# Patient Record
Sex: FEMALE | Race: WHITE | NOT HISPANIC OR LATINO | Employment: FULL TIME | URBAN - METROPOLITAN AREA
[De-identification: names, ages, dates, MRNs, and addresses within clinical notes are randomized per-mention and may not be internally consistent; named-entity substitution may affect disease eponyms.]

---

## 2024-01-17 ENCOUNTER — OFFICE VISIT (OUTPATIENT)
Dept: NEUROLOGY | Facility: CLINIC | Age: 34
End: 2024-01-17
Payer: COMMERCIAL

## 2024-01-17 VITALS
TEMPERATURE: 98.4 F | OXYGEN SATURATION: 99 % | HEART RATE: 63 BPM | SYSTOLIC BLOOD PRESSURE: 118 MMHG | DIASTOLIC BLOOD PRESSURE: 70 MMHG | BODY MASS INDEX: 23.61 KG/M2 | HEIGHT: 64 IN | WEIGHT: 138.3 LBS

## 2024-01-17 DIAGNOSIS — G47.33 OSA (OBSTRUCTIVE SLEEP APNEA): ICD-10-CM

## 2024-01-17 DIAGNOSIS — G43.009 MIGRAINE WITHOUT AURA AND WITHOUT STATUS MIGRAINOSUS, NOT INTRACTABLE: Primary | ICD-10-CM

## 2024-01-17 DIAGNOSIS — E55.9 VITAMIN D DEFICIENCY: ICD-10-CM

## 2024-01-17 DIAGNOSIS — E53.8 VITAMIN B12 DEFICIENCY: ICD-10-CM

## 2024-01-17 PROCEDURE — 99215 OFFICE O/P EST HI 40 MIN: CPT | Performed by: PSYCHIATRY & NEUROLOGY

## 2024-01-17 PROCEDURE — G2212 PROLONG OUTPT/OFFICE VIS: HCPCS | Performed by: PSYCHIATRY & NEUROLOGY

## 2024-01-17 RX ORDER — SUMATRIPTAN 100 MG/1
100 TABLET, FILM COATED ORAL ONCE AS NEEDED
Qty: 9 TABLET | Refills: 11 | Status: SHIPPED | OUTPATIENT
Start: 2024-01-17

## 2024-01-17 RX ORDER — LEVONORGESTREL AND ETHINYL ESTRADIOL 0.1-0.02MG
20 KIT ORAL DAILY
COMMUNITY
Start: 2023-10-16

## 2024-01-17 NOTE — PROGRESS NOTES
Review of Systems   Constitutional:  Negative for appetite change, fatigue and fever.   HENT: Negative.  Negative for hearing loss, tinnitus, trouble swallowing and voice change.    Eyes: Negative.  Negative for photophobia, pain and visual disturbance.   Respiratory: Negative.  Negative for shortness of breath.    Cardiovascular: Negative.  Negative for palpitations.   Gastrointestinal: Negative.  Negative for nausea and vomiting.   Endocrine: Negative.  Negative for cold intolerance.   Genitourinary: Negative.  Negative for dysuria, frequency and urgency.   Musculoskeletal:  Negative for back pain, gait problem, myalgias, neck pain and neck stiffness.   Skin: Negative.  Negative for rash.   Allergic/Immunologic: Negative.    Neurological:  Positive for light-headedness (if she gets up too fast w/ a migraine) and headaches (Wakes up with a dull headache). Negative for dizziness, tremors, seizures, syncope, facial asymmetry, speech difficulty, weakness and numbness.   Hematological: Negative.  Does not bruise/bleed easily.   Psychiatric/Behavioral: Negative.  Negative for confusion, hallucinations and sleep disturbance.    All other systems reviewed and are negative.

## 2024-01-17 NOTE — PATIENT INSTRUCTIONS
Consider vitamin B12 1000 to 2000 mcg sublingual/under your tongue daily  Consider increasing her magnesium from 250 mg up to 500 mg    For future reference:  Rescue medications thought to be likely ok during pregnancy:   First line: tylenol/acetaminophen, diphenhydramine/benadryl, Metoclopramide/Reglan, [NSAIDs (ibuprofen, naproxen and diclofenac) could be considered between 12-20 weeks only , but check with OB]  Second line:  rizatriptan/sumatriptan and other triptans, Ondansetron/zofran, prednisone short acting, prochlorperazine/compazine, promethazine/Phenergan, [also fioricet (butalbital-acetaminophen-caffeine) although I do not recommend this typically] -> instead could just take Excedrin tension which is similar to Excedrin Migraine without the aspirin  Avoid when possible: aspirin (except in those instructed to take by OB for other reasons), indomethacin, opioids   Always avoid: DHE/ergots, currently the new migraine meds have too many unknowns so we are avoiding for now until more information - lasmiditan/reyvow, Nurtec/rimegepant, Ubrelvy/Ubrogepant     These medications are thought to be compatible with breast-feeding:  Ibuprofen, acetaminophen, triptans  Probably compatible with breast-feeding:  Diphenhydramine/Benadryl, metoclopramide/Reglan, ondansetron /Zofran, if needed steroids, nurtec  prochlorperazine/Compazine  Avoid: Aspirin, DHE/ergot's, lasmiditan/Reyvow            -If you feel like you could sleep on your back that night only, certainly could try to sleep on your back for the sleep study, but not if you feel like you cannot sleep this way.  Just getting sleep is the most important thing, as the machine thinks you are sleeping the entire time it is plug in. Otherwise we discussed home sleep study can underestimate the problem and try not to plug in the machine until you are just about to fall asleep.  If it comes back that you almost have sleep apnea or other sleep disorder, but not meeting  criteria, we could consider in lab study and reach out to me if you would like this ordered before next visit.    I am placing a referral for a sleep study and if it is abnormal we will place one to the sleep medicine specialist team to further evaluate your sleep issues.  Please call [phone] to schedule the sleep study and afterwards if needed I recommend you see one of the following providers:  Randy Kay PA-C        Headache/migraine treatment:   Abortive medications (for immediate treatment of a headache):   It is ok to take ibuprofen, acetaminophen or naproxen (Advil, Tylenol,  Aleve, Excedrin) if they help your headaches you should limit these to No more than 3 times a week to avoid medication overuse/rebound headaches.      For your more moderate to severe migraines take this medication early   -     SUMAtriptan (IMITREX) 100 mg tablet; Take 1 tablet (100 mg total) by mouth once as needed for migraine May repeat x1 in 2 hours, max 2/24 hours, 9/month      Trial of Nurtec next or we discussed if your insurance prefers Ubrelvy would switch to that  Both of these would require a paperwork on the prior authorization but typically takes 1 to 2 weeks for your insurance to approve.  Then once it is approved there is a coupon card at each of their websites that completely covers the co-pay  *Although we are getting the ball rolling with getting Nurtec approved once you start to consider getting pregnant or at least once pregnant I would stop Nurtec just out of an abundance of precaution since we do not know what dose during pregnancy       Over the counter preventive supplements for headaches/migraines - try for 3 months   (to take every day to help prevent headaches - not to take at the time of headache):  [x] Magnesium 400mg -500 mg daily (If any diarrhea or upset stomach, decrease dose  as tolerated)  []  Riboflavin (Vitamin B2)  400mg daily (adults) - try online   (FYI B2 may make your urine bright/neon yellow)        Prescription preventive medications for headaches/migraines   (to take every day to help prevent headaches - not to take at the time of headache):  [x] we have lots of options if needed       *Typically these types of medications take time untill you see the benefit, although some may see improvement in days, often it may take weeks, especially if the medication is being titrated up to a beneficial level. Please contact us if there are any concerns or questions regarding the medication.         Lifestyle Recommendations:  [x] SLEEP - Maintain a regular sleep schedule: Adults need at least 7-8 hours of uninterrupted a night. Maintain good sleep hygiene:  Going to bed and waking up at consistent times, avoiding excessive daytime naps, avoiding caffeinated beverages in the evening, avoid excessive stimulation in the evening and generally using bed primarily for sleeping.  One hour before bedtime would recommend turning lights down lower, decreasing your activity (may read quietly, listen to music at a low volume). When you get into bed, should eliminate all technology (no texting, emailing, playing with your phone, iPad or tablet in bed).  [x] HYDRATION - Maintain good hydration.  Drink  2L of fluid a day (4 typical small water bottles)  [x] DIET - Maintain good nutrition. In particular don't skip meals and try and eat healthy balanced meals regularly.  [x] TRIGGERS - Look for other triggers and avoid them: Limit caffeine to 1-2 cups a day or less. Avoid dietary triggers that you have noticed bring on your headaches (this could include aged cheese, peanuts, MSG, aspartame and nitrates).  [x] EXERCISE - physical exercise as we all know is good for you in many ways, and not only is good for your heart, but also is beneficial for your mental health, cognitive health and  chronic pain/headaches. I would  encourage at the least 5 days of physical exercise weekly for at least 30 minutes.      Education and Follow-up  [x] Please call with any questions or concerns. Of course if any new concerning symptoms go to the emergency department.  [x] Follow up at least 1 week after MRI and ideally after sleep study but does not have to be - 2-3 months, sooner if needed     - As we discussed if there are no abnormalities on the brain MRI that need urgent intervention (very often there are incidental findings that may not mean anything significant and are better discussed in person), you will not necessarily receive a call from us, but feel free to call in to check on the status of the report (since not knowing can be anxiety provoking) and the nurses will let you know the result or make sure I have nothing to pass on regarding the results first.  Ideally, all of this will be discussed in detail in the follow-up visit.

## 2024-01-17 NOTE — PROGRESS NOTES
[facility] Consult - Follow up   PATIENT:  Merry Venegas  MRN:  [id number]  :  1990  DATE OF SERVICE:  2024  REFERRED BY: No ref. provider found  PMD: Adrian Huffman DO    Assessment/Plan:   Merry Germain is a delightful 33 y.o. female with a past medical history that includes dysthymia/depression, migraines, vitamin-D deficiency  referred here for evaluation of headache.  My initial evaluation 2020     Migraine without aura and without status migrainosus, not intractable  She reports a personal history of migraines in family history of migraines.  She reports pain is typically right frontal and occipital shooting pulsating pressure and rarely on the left side.  She denies aura and reports typical associated migrainous features without associated autonomic features.  - as of 2020: 3-4 migraines a month  - as of 2020: migraines maybe 2-3 a month, not taking supplement but still seem better, rizatriptan helps But makes her tired.  Will see if prochlorperazine helps but does not make her tired.  Trial of magnesium for prevention  - as of 2021: She continues to do well with migraines to 3 times per month.  Taking magnesium for prevention and rizatriptan helps for abortive.  - as of 3/1/2022: continues to do well with about 4 migraines per month on magnesium for prevention that are milder, shorter and respond to ibuprofen, exedrin or rizatriptan.   - as of 2024: She reports increasing frequency and severity of migraines as well as new features and we discussed MRI brain to further evaluate.  She reports over the past year migraines have been worse and on average once a week but she may wake up with a dull headache on most days.  Migraines cannot be associated with positional lightheadedness at times.  Recent eye exam 2023 no papilledema.  Fatigue despite full nights of sleep, symptoms suggestive of possible sleep disorder and sleep  study ordered to further evaluate for possible sleep disorder contributing to symptoms.  She also recently got  and is considering pregnancy in summer 2024 and we discussed the unknowns of medications in pregnancy as well as current guidelines as to what is thought to be the safest although still unknowns.     Workup:  -Due to increased frequency and severity of headaches and migraines as well as concern for increased intracranial pressure which requires ruling out nefarious pathology in the brain increasing pressure, recommend further evaluation with MRI brain with and without contrast to rule out structural or treatable causes of symptoms.  We will pay close attention to the cerebellar tonsils to see if there is any cerebellar ectopia or signs of increased intracranial pressure.  -     Comprehensive metabolic panel; Future  -     CBC and differential; Future  -     TSH, 3rd generation with Free T4 reflex; Future  -     Vitamin D 25 hydroxy; Future  -     Vitamin B12; Future     Preventative:  - we discussed headache hygiene and lifestyle factors that may improve headaches  -We discussed we have multiple prescription preventative options, but they are somewhat limited during pregnancy and prior to pregnancy.  Recommended at minimum increasing magnesium from 250 mg to 500 mg, adding vitamin B2  - On through the providers:  Magnesium 250 mg, vit D 5000 units, Vit C 500 mg, Vit B12 1 tab daily, Lexapro 5 mg (started prior in 2020, off for about a year in 2023 and then restarted at 5 mg around Dec 2023 as she was wondering if that is why she was having more migraines, but mood and otherwise felt fine off of it and she stopped it because she felt like she was in a fog on it.  - past/failed:  Lexapro, antihypertensive such as propranolol or verapamil would be contraindicated currently due to hypotension, amitriptyline or venlafaxine would be currently contraindicated due to interaction with Lexapro  - future  options when not trying to get pregnant or chance of pregnancy:  topiramate, acetazolamide/Diamox, CGRP med, botox   -Future options when trying for pregnancy or pregnant: Cyproheptadine, cyclobenzaprine, propranolol, verapamil, amitriptyline, acetazolamide, she is not currently interested in Botox as we discussed if she were would need lead time since it takes time to kick in and she would have to try to prescription preventatives prior     Abortive:  - discussed not taking over-the-counter or prescription pain medications more than 3 days per week to prevent medication overuse/rebound headache  -   Trial of SUMAtriptan (IMITREX) 100 mg tablet; Take 1 tablet (100 mg total) by mouth once as needed for migraine May repeat x1 in 2 hours, max 2/24 hours, 9/month. Discussed proper use, possible side effects and risks.  - past/helped:  Toradol IM in the past help  - past/failed:  OTC meds, prochlorperazine helped a little only, rizatriptan helps less than it used to and sometimes needs two and can make her tired, sumatriptan now  - future options:  metoclopramide, Toradol IM or p.o., could consider trial for 5 days of Depakote or dexamethasone 4 prolonged migraine, ubrelvy, reyvow, nurtec next if needed discussed*      MELO (obstructive sleep apnea)  -     Ambulatory Referral to Sleep Medicine; Future - home study     Patient instructions     Consider vitamin B12 1000 to 2000 mcg sublingual/under your tongue daily  Consider increasing her magnesium from 250 mg up to 500 mg      For future reference:  Rescue medications thought to be likely ok during pregnancy:   First line: tylenol/acetaminophen, diphenhydramine/benadryl, Metoclopramide/Reglan, [NSAIDs (ibuprofen, naproxen and diclofenac) could be considered between 12-20 weeks only , but check with OB]  Second line:  rizatriptan/sumatriptan and other triptans, Ondansetron/zofran, prednisone short acting, prochlorperazine/compazine, promethazine/Phenergan, [also fioricet  (butalbital-acetaminophen-caffeine) although I do not recommend this typically] -> instead could just take Excedrin tension which is similar to Excedrin Migraine without the aspirin  Avoid when possible: aspirin (except in those instructed to take by OB for other reasons), indomethacin, opioids   Always avoid: DHE/ergots, currently the new migraine meds have too many unknowns so we are avoiding for now until more information - lasmiditan/reyvow, Nurtec/rimegepant, Ubrelvy/Ubrogepant     These medications are thought to be compatible with breast-feeding:  Ibuprofen, acetaminophen, triptans  Probably compatible with breast-feeding:  Diphenhydramine/Benadryl, metoclopramide/Reglan, ondansetron /Zofran, if needed steroids, nurtec  prochlorperazine/Compazine  Avoid: Aspirin, DHE/ergot's, lasmiditan/Reyvow      ---------------------------------    -If you feel like you could sleep on your back that night only, certainly could try to sleep on your back for the sleep study, but not if you feel like you cannot sleep this way.  Just getting sleep is the most important thing, as the machine thinks you are sleeping the entire time it is plug in. Otherwise we discussed home sleep study can underestimate the problem and try not to plug in the machine until you are just about to fall asleep.  If it comes back that you almost have sleep apnea or other sleep disorder, but not meeting criteria, we could consider in lab study and reach out to me if you would like this ordered before next visit.    I am placing a referral for a sleep study and if it is abnormal we will place one to the sleep medicine specialist team to further evaluate your sleep issues.  Please call [phone] to schedule the sleep study and afterwards if needed I recommend you see one of the following providers:  Randy Gordon DO  Earle Martin PA-C        Headache/migraine  treatment:   Abortive medications (for immediate treatment of a headache):   It is ok to take ibuprofen, acetaminophen or naproxen (Advil, Tylenol,  Aleve, Excedrin) if they help your headaches you should limit these to No more than 3 times a week to avoid medication overuse/rebound headaches.      For your more moderate to severe migraines take this medication early   -     SUMAtriptan (IMITREX) 100 mg tablet; Take 1 tablet (100 mg total) by mouth once as needed for migraine May repeat x1 in 2 hours, max 2/24 hours, 9/month      Trial of Nurtec next or we discussed if your insurance prefers Ubrelvy would switch to that  Both of these would require a paperwork on the prior authorization but typically takes 1 to 2 weeks for your insurance to approve.  Then once it is approved there is a coupon card at each of their websites that completely covers the co-pay  *Although we are getting the ball rolling with getting Nurtec approved once you start to consider getting pregnant or at least once pregnant I would stop Nurtec just out of an abundance of precaution since we do not know what dose during pregnancy       Over the counter preventive supplements for headaches/migraines - try for 3 months   (to take every day to help prevent headaches - not to take at the time of headache):  [x] Magnesium 400mg -500 mg daily (If any diarrhea or upset stomach, decrease dose  as tolerated)  [x] Riboflavin (Vitamin B2)  400mg daily (adults) - try online   (FYI B2 may make your urine bright/neon yellow)        Prescription preventive medications for headaches/migraines   (to take every day to help prevent headaches - not to take at the time of headache):  [x] we have lots of options if needed/wanted       *Typically these types of medications take time untill you see the benefit, although some may see improvement in days, often it may take weeks, especially if the medication is being titrated up to a beneficial level. Please contact us  if there are any concerns or questions regarding the medication.         Lifestyle Recommendations:  [x] SLEEP - Maintain a regular sleep schedule: Adults need at least 7-8 hours of uninterrupted a night. Maintain good sleep hygiene:  Going to bed and waking up at consistent times, avoiding excessive daytime naps, avoiding caffeinated beverages in the evening, avoid excessive stimulation in the evening and generally using bed primarily for sleeping.  One hour before bedtime would recommend turning lights down lower, decreasing your activity (may read quietly, listen to music at a low volume). When you get into bed, should eliminate all technology (no texting, emailing, playing with your phone, iPad or tablet in bed).  [x] HYDRATION - Maintain good hydration.  Drink  2L of fluid a day (4 typical small water bottles)  [x] DIET - Maintain good nutrition. In particular don't skip meals and try and eat healthy balanced meals regularly.  [x] TRIGGERS - Look for other triggers and avoid them: Limit caffeine to 1-2 cups a day or less. Avoid dietary triggers that you have noticed bring on your headaches (this could include aged cheese, peanuts, MSG, aspartame and nitrates).  [x] EXERCISE - physical exercise as we all know is good for you in many ways, and not only is good for your heart, but also is beneficial for your mental health, cognitive health and  chronic pain/headaches. I would encourage at the least 5 days of physical exercise weekly for at least 30 minutes.      Education and Follow-up  [x] Please call with any questions or concerns. Of course if any new concerning symptoms go to the emergency department.  [x] Follow up at least 1 week after MRI and ideally after sleep study but does not have to be - 2-3 months, sooner if needed     - As we discussed if there are no abnormalities on the brain MRI that need urgent intervention (very often there are incidental findings that may not mean anything significant and are  better discussed in person), you will not necessarily receive a call from us, but feel free to call in to check on the status of the report (since not knowing can be anxiety provoking) and the nurses will let you know the result or make sure I have nothing to pass on regarding the results first.  Ideally, all of this will be discussed in detail in the follow-up visit.           CC:   We had the pleasure of evaluating Merry Germain in neurological consultation today. Merry Germain is a    right handed female who presents today for evaluation of headaches.      History obtained from patient as well as available medical record review.  History of Present Illness:   Interval history as of 1/17/2024  - no significant new or concerning neurologic symptoms since last visit   -It has been almost 2 years since I last saw her  -  in Oct 2023, moved and living with grandma for now, renovating the house and selling next week, a lot of environmental changes - family planning wise - sometime maybe around summer 2024   - eye exam - June 2023 - contacts sometimes and no changes, pretty mild, -1.5, increases sometimes, none worse, no papilledema, no changes lately     Headaches and migraines   Gradually increasing over the past year  Once a week on avg   Normally lasting 8 hours, but Last week was all week     Can wake up with a dull headache - most days   Can have lightheadedness if she gets up too fast while dealing with a migraine    Preventative:   - Magnesium 250 mg, vit D 5000 units, Vit C 500 mg, Vit B12 1 tab daily  - through other providers: lexapro 5 mg - felt like in a fog on it - that's why she stopped taking it and felt fine off of it and started it in case it was why headaches were worse     Abortive:   Exedrin migraine - maybe 2-3 times a month or more (2 cups a day)  Ibuprofen - not much  Rizatriptan - helps often but sometimes not enough and has to repeat, if she wakes up with makes her more tired but can  manage  Denies bothersome side effects      - sleep - 6-8 hours, sometimes with headaches, otherwise so exhausted out like light, tired in morning, sleep not refreshing, wakes up maybe 1-2 times to pee or dogs, side or back, starts out on back, just got a new pillow, snores, every one in family snores loudly, 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  0 - would never doze  1 - slight chance of dozing  2 - moderate chance of dozing  3 - high chance of dozing    [place] sleepiness scale  Sitting and reading - 2  Watching TV - 3  Sitting, inactive in a public place such as a theater 1  As a passenger in a car for an hour without a break 1  Lying down to rest in afternoon when circumstances permit 3  Sitting and talking to someone 0  Sitting quietly after lunch without alcohol 2  In a car while stopped for few minutes in traffic -0      Interval history as of 3/1/2022  - denies any new or concerning neurologic symptoms since last visit     Headaches and migraines   - continues to do well with about 4 migraines per month that are milder than before and last shorter periods  Preventative: magnesium  Abortive:  Ibuprofen, Rizatriptan works   - prochlorperazine does not work  Denies bothersome side effects     Interval history as of 02/22/2021:  - no new concerns  Migraines are improving, not as frequent and not as severe, 2-3 a month  Prevention: magnesium   Abortive: rizatriptan helps But makes her tired.    - prochlorperazine does not work as well     Interval history as of 9/21/2020:  Migraines - as of 9/21/2020: maybe 2-3 a month, not taking supplements, rizatriptan helps   Prevention:  Headache preventative supplements  Abortive:  trial of rizatriptan - working well, used 8 in 3 months, makes her a little sleepy         Headaches started at what age? More so in mid 20s years old  How often do the headaches  occur?   - as of 6/17/2020: 3-4 migraines a month  - as of 9/21/2020: maybe 2-3 a month, not taking supplements, rizatriptan helps   What time of the day do the headaches start?  Often wakes up with it   How long do the headaches last? 1-2 days, Up to 2 weeks   Are you ever headache free? Yes     Aura? without aura     Last eye exam:   1/2020 - normal except for glasses      Where is your headache located and pain quality?   - right frontal and occipital - shooting, pulsating, pressure  - rarely on the left side      What is the intensity of pain? Average: 4-5/10, worst 6-7/10  Associated symptoms:   [x] Nausea       [] Vomiting        [] Diarrhea   [x] Stiff or sore neck   [x] Problems with concentration  [x] Photophobia  >   [x]Phonophobia      [] Osmophobia  [x] Blurred vision - maybe slight  [x] Prefer quiet, dark room  [] Light-headed or dizzy     [] Tinnitus    [] Hands or feet tingle or feel numb/paresthesias       [] Red ear      [] Ptosis      [] Facial droop  [] Lacrimation - both eyes without migraine  [] Nasal congestion- pressure sometimes without migraine    [] Flushing of face  [] Change in pupil size        Things that make the headache worse? No specific movements, any movement      Headache triggers:   menses      Have you seen someone else for headaches or pain? Yes, PCP  Have you had trigger point injection performed and how often? No  Have you had Botox injection performed and how often? No   Have you had epidural injections or transforaminal injections performed? No  Are you current pregnant or planning on getting pregnant? No plans, TBD, on birth control   Have you ever had any Brain imaging? no     What medications do you take or have you taken for your headaches?   ABORTIVE:    OTC medications have been ineffective      Excedrin - helps a little      Past:   toradol IM - helped      PREVENTIVE:   -     For mood:  Lexapro 10 mg - over a year and helping      Past:  Antihypertensive such as  verapamil or propranolol be contraindicated due to hypotension at this time     Alternative therapies used in the past for headaches? no     LIFESTYLE  Sleep   - averages: 7-9 hours  Problems falling asleep?:   No  Problems staying asleep?:  No     Physical activity: 3 days a week      Water: 1 gallon per day  Caffeine: 2 cups per day     Mood: dysthmia  - counseling when younger      The following portions of the patient's history were reviewed and updated as appropriate: allergies, current medications, past family history, past medical history, past social history, past surgical history and problem list.     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  Education: BS  Lives with boyfriend and dog      Illicit Drugs: denies  Alcohol/tobacco: quit tobacco 2018, social alcohol     Past Medical History:     Past Medical History:   Diagnosis Date    Depression     Migraine        Patient Active Problem List   Diagnosis    Migraine headache    Dysthymia    Migraine without aura and without status migrainosus, not intractable       Medications:      Current Outpatient Medications   Medication Sig Dispense Refill    ascorbic acid (VITAMIN C) 250 mg tablet Take 250 mg by mouth daily      cholecalciferol (VITAMIN D3) 400 units tablet Take 400 Units by mouth daily      Cyanocobalamin (VITAMIN B 12 PO) Take 1 tablet by mouth daily as needed      escitalopram (LEXAPRO) 10 mg tablet Take 1 tablet (10 mg total) by mouth daily 30 tablet 5    LESSINA 0.1-20 MG-MCG per tablet       levonorgestrel-ethinyl estradiol (AVIANE,ALESSE,LESSINA) 0.1-20 MG-MCG per tablet Take 20 mcg by mouth daily      magnesium gluconate (MAGONATE) 500 mg tablet Take 250 mg by mouth daily      Multiple Vitamin (MULTIVITAMIN) tablet Take 1 tablet by mouth daily      SUMAtriptan  (IMITREX) 100 mg tablet Take 1 tablet (100 mg total) by mouth once as needed for migraine May repeat x1 in 2 hours, max 2/24 hours, 9/month 9 tablet 11     No current facility-administered medications for this visit.        Allergies:      Allergies   Allergen Reactions    Sulfamethoxazole-Trimethoprim      Other reaction(s): Other (Please comment)  Reported by Pharmacy       Family History:     Family History   Problem Relation Age of Onset    Hypertension Mother     Asthma Mother     Fibromyalgia Mother     Colon cancer Father     No Known Problems Sister     No Known Problems Brother     COPD Maternal Grandmother     No Known Problems Maternal Grandfather     Hypothyroidism Paternal Grandmother     Diabetes Paternal Grandfather     Heart block Paternal Grandfather     No Known Problems Brother        Social History:     Social History     Socioeconomic History    Marital status: /Civil Union     Spouse name: Not on file    Number of children: Not on file    Years of education: Not on file    Highest education level: Not on file   Occupational History    Occupation:    Tobacco Use    Smoking status: Former     Passive exposure: Never    Smokeless tobacco: Never    Tobacco comments:     2018   Vaping Use    Vaping status: Never Used   Substance and Sexual Activity    Alcohol use: Yes     Comment: occassional    Drug use: Never    Sexual activity: Yes     Partners: Male   Other Topics Concern    Not on file   Social History Narrative    Wears eat belt     Social Determinants of Health     Financial Resource Strain: Not on file   Food Insecurity: Not on file   Transportation Needs: Not on file   Physical Activity: Not on file   Stress: Not on file   Social Connections: Not on file   Intimate Partner Violence: Not on file   Housing Stability: Not on file         Objective:     Physical Exam:                                                                 Vitals:            Constitutional:    BP  "118/70 (BP Location: Left arm, Patient Position: Sitting, Cuff Size: Adult)   Pulse 63   Temp 98.4 °F (36.9 °C) (Temporal)   Ht 5' 4\" (1.626 m)   Wt 62.7 kg (138 lb 4.8 oz)   SpO2 99%   BMI 23.74 kg/m²   BP Readings from Last 3 Encounters:   01/17/24 118/70   05/13/23 97/71   07/18/22 112/72     Pulse Readings from Last 3 Encounters:   01/17/24 63   09/09/23 78   05/13/23 90         Well developed, well nourished, well groomed.        Psychiatric:  Normal behavior and appropriate affect        Neurological Examination:     Mental status/cognitive function:   Recent and remote memory appear intact. Attention span and concentration as well as fund of knowledge are appropriate for age. Normal language and spontaneous speech.  Cranial Nerves:   VII-facial expression symmetric  Motor Exam: symmetric bulk throughout. no atrophy, fasciculations or abnormal movements noted.   Coordination:  no apparent dysmetria, ataxia or tremor noted  Gait: steady casual gait         Pertinent lab results:   See EMR for recent labs  -7/20/2021   05/27/2020 CMP and CBC unremarkable, TSH normal     Imaging:   No head imaging noted in system     Review of Systems:   ROS obtained by medical assistant and personally reviewed, but if any symptoms listed below say negative, does not mean patient has not had this symptom since last visit, please see HPI for details of symptoms discussed this visit. I recommended PCP follow up for non neurologic problems.      Review of Systems   Constitutional:  Negative for appetite change, fatigue and fever.   HENT: Negative.  Negative for hearing loss, tinnitus, trouble swallowing and voice change.    Eyes: Negative.  Negative for photophobia, pain and visual disturbance.   Respiratory: Negative.  Negative for shortness of breath.    Cardiovascular: Negative.  Negative for palpitations.   Gastrointestinal: Negative.  Negative for nausea and vomiting.   Endocrine: Negative.  Negative for cold " intolerance.   Genitourinary: Negative.  Negative for dysuria, frequency and urgency.   Musculoskeletal:  Negative for back pain, gait problem, myalgias, neck pain and neck stiffness.   Skin: Negative.  Negative for rash.   Allergic/Immunologic: Negative.    Neurological:  Positive for light-headedness (if she gets up too fast w/ a migraine) and headaches (Wakes up with a dull headache). Negative for dizziness, tremors, seizures, syncope, facial asymmetry, speech difficulty, weakness and numbness.   Hematological: Negative.  Does not bruise/bleed easily.   Psychiatric/Behavioral: Negative.  Negative for confusion, hallucinations and sleep disturbance.    All other systems reviewed and are negative.       I have spent 60 minutes with Patient  today in which greater than 50% of this time was spent in counseling/coordination of care regarding Diagnostic results, Prognosis, Risks and benefits of tx options, Instructions for management, Patient and family education, Importance of tx compliance, Risk factor reductions, Impressions, Counseling / Coordination of care, Documenting in the medical record, Reviewing / ordering tests, medicine, procedures  , and Obtaining or reviewing history  . I also spent  7 minutes non face to face for this patient the same day.       Author:  Argenis Knapp MD 1/17/2024 6:10 PM

## 2024-01-19 DIAGNOSIS — G47.33 OSA (OBSTRUCTIVE SLEEP APNEA): Primary | ICD-10-CM

## 2024-01-26 ENCOUNTER — HOSPITAL ENCOUNTER (OUTPATIENT)
Dept: SLEEP CENTER | Facility: HOSPITAL | Age: 34
Discharge: HOME | End: 2024-01-26
Attending: PSYCHIATRY & NEUROLOGY
Payer: COMMERCIAL

## 2024-01-26 ENCOUNTER — APPOINTMENT (OUTPATIENT)
Dept: LAB | Facility: HOSPITAL | Age: 34
End: 2024-01-26
Payer: COMMERCIAL

## 2024-01-26 DIAGNOSIS — G47.33 OSA (OBSTRUCTIVE SLEEP APNEA): ICD-10-CM

## 2024-01-26 DIAGNOSIS — E53.8 VITAMIN B12 DEFICIENCY: ICD-10-CM

## 2024-01-26 DIAGNOSIS — G43.009 MIGRAINE WITHOUT AURA AND WITHOUT STATUS MIGRAINOSUS, NOT INTRACTABLE: ICD-10-CM

## 2024-01-26 DIAGNOSIS — E55.9 VITAMIN D DEFICIENCY: ICD-10-CM

## 2024-01-26 LAB
25(OH)D3 SERPL-MCNC: 94.1 NG/ML (ref 30–100)
ALBUMIN SERPL BCP-MCNC: 4.6 G/DL (ref 3.5–5)
ALP SERPL-CCNC: 33 U/L (ref 34–104)
ALT SERPL W P-5'-P-CCNC: 11 U/L (ref 7–52)
ANION GAP SERPL CALCULATED.3IONS-SCNC: 6 MMOL/L
AST SERPL W P-5'-P-CCNC: 20 U/L (ref 13–39)
BASOPHILS # BLD AUTO: 0.06 THOUSANDS/ÂΜL (ref 0–0.1)
BASOPHILS NFR BLD AUTO: 1 % (ref 0–1)
BILIRUB SERPL-MCNC: 0.48 MG/DL (ref 0.2–1)
BUN SERPL-MCNC: 11 MG/DL (ref 5–25)
CALCIUM SERPL-MCNC: 9.6 MG/DL (ref 8.4–10.2)
CHLORIDE SERPL-SCNC: 103 MMOL/L (ref 96–108)
CO2 SERPL-SCNC: 27 MMOL/L (ref 21–32)
CREAT SERPL-MCNC: 0.88 MG/DL (ref 0.6–1.3)
EOSINOPHIL # BLD AUTO: 0.16 THOUSAND/ÂΜL (ref 0–0.61)
EOSINOPHIL NFR BLD AUTO: 3 % (ref 0–6)
ERYTHROCYTE [DISTWIDTH] IN BLOOD BY AUTOMATED COUNT: 12.4 % (ref 11.6–15.1)
GFR SERPL CREATININE-BSD FRML MDRD: 86 ML/MIN/1.73SQ M
GLUCOSE P FAST SERPL-MCNC: 91 MG/DL (ref 65–99)
HCT VFR BLD AUTO: 41.6 % (ref 34.8–46.1)
HGB BLD-MCNC: 13.4 G/DL (ref 11.5–15.4)
IMM GRANULOCYTES # BLD AUTO: 0.01 THOUSAND/UL (ref 0–0.2)
IMM GRANULOCYTES NFR BLD AUTO: 0 % (ref 0–2)
LYMPHOCYTES # BLD AUTO: 1.22 THOUSANDS/ÂΜL (ref 0.6–4.47)
LYMPHOCYTES NFR BLD AUTO: 20 % (ref 14–44)
MCH RBC QN AUTO: 30.9 PG (ref 26.8–34.3)
MCHC RBC AUTO-ENTMCNC: 32.2 G/DL (ref 31.4–37.4)
MCV RBC AUTO: 96 FL (ref 82–98)
MONOCYTES # BLD AUTO: 0.44 THOUSAND/ÂΜL (ref 0.17–1.22)
MONOCYTES NFR BLD AUTO: 7 % (ref 4–12)
NEUTROPHILS # BLD AUTO: 4.2 THOUSANDS/ÂΜL (ref 1.85–7.62)
NEUTS SEG NFR BLD AUTO: 69 % (ref 43–75)
NRBC BLD AUTO-RTO: 0 /100 WBCS
PLATELET # BLD AUTO: 236 THOUSANDS/UL (ref 149–390)
PMV BLD AUTO: 9.7 FL (ref 8.9–12.7)
POTASSIUM SERPL-SCNC: 4.4 MMOL/L (ref 3.5–5.3)
PROT SERPL-MCNC: 7.7 G/DL (ref 6.4–8.4)
RBC # BLD AUTO: 4.34 MILLION/UL (ref 3.81–5.12)
SODIUM SERPL-SCNC: 136 MMOL/L (ref 135–147)
TSH SERPL DL<=0.05 MIU/L-ACNC: 1.44 UIU/ML (ref 0.45–4.5)
WBC # BLD AUTO: 6.09 THOUSAND/UL (ref 4.31–10.16)

## 2024-01-26 PROCEDURE — 82306 VITAMIN D 25 HYDROXY: CPT

## 2024-01-26 PROCEDURE — 84443 ASSAY THYROID STIM HORMONE: CPT

## 2024-01-26 PROCEDURE — 36415 COLL VENOUS BLD VENIPUNCTURE: CPT

## 2024-01-26 PROCEDURE — 82607 VITAMIN B-12: CPT

## 2024-01-26 PROCEDURE — 85025 COMPLETE CBC W/AUTO DIFF WBC: CPT

## 2024-01-26 PROCEDURE — G0399 HOME SLEEP TEST/TYPE 3 PORTA: HCPCS

## 2024-01-26 PROCEDURE — 80053 COMPREHEN METABOLIC PANEL: CPT

## 2024-01-26 NOTE — PROGRESS NOTES
Home Sleep Study Documentation    HOME STUDY DEVICE: Noxturnal no                                           Thania G3 yes      Pre-Sleep Home Study:    Set-up and instructions performed by: Awilda    Technician performed demonstration for Patient: yes    Return demonstration performed by Patient: yes    Written instructions provided to Patient: yes    Patient signed consent form: yes        Post-Sleep Home Study:    Additional comments by Patient: None    Home Sleep Study Failed:no:    Failure reason: N/A    Reported or Detected: N/A    Scored by: VERONICA Heard

## 2024-01-27 LAB — VIT B12 SERPL-MCNC: 1025 PG/ML (ref 180–914)

## 2024-01-29 PROBLEM — G47.33 OSA (OBSTRUCTIVE SLEEP APNEA): Status: ACTIVE | Noted: 2024-01-29

## 2024-01-31 ENCOUNTER — HOSPITAL ENCOUNTER (OUTPATIENT)
Dept: MRI IMAGING | Facility: HOSPITAL | Age: 34
Discharge: HOME | End: 2024-01-31
Attending: PSYCHIATRY & NEUROLOGY
Payer: COMMERCIAL

## 2024-01-31 DIAGNOSIS — G43.009 MIGRAINE WITHOUT AURA AND WITHOUT STATUS MIGRAINOSUS, NOT INTRACTABLE: ICD-10-CM

## 2024-01-31 PROCEDURE — G1004 CDSM NDSC: HCPCS

## 2024-01-31 PROCEDURE — 70553 MRI BRAIN STEM W/O & W/DYE: CPT

## 2024-01-31 PROCEDURE — A9585 GADOBUTROL INJECTION: HCPCS | Performed by: PSYCHIATRY & NEUROLOGY

## 2024-01-31 RX ORDER — GADOBUTROL 604.72 MG/ML
6 INJECTION INTRAVENOUS
Status: COMPLETED | OUTPATIENT
Start: 2024-01-31 | End: 2024-01-31

## 2024-01-31 RX ADMIN — GADOBUTROL 6 ML: 604.72 INJECTION INTRAVENOUS at 10:22

## 2024-02-23 ENCOUNTER — OFFICE VISIT (OUTPATIENT)
Dept: FAMILY MEDICINE CLINIC | Facility: CLINIC | Age: 34
End: 2024-02-23
Payer: COMMERCIAL

## 2024-02-23 VITALS
DIASTOLIC BLOOD PRESSURE: 68 MMHG | OXYGEN SATURATION: 99 % | TEMPERATURE: 98.3 F | BODY MASS INDEX: 22.81 KG/M2 | HEART RATE: 69 BPM | HEIGHT: 64 IN | SYSTOLIC BLOOD PRESSURE: 126 MMHG | WEIGHT: 133.6 LBS

## 2024-02-23 DIAGNOSIS — B02.9 HERPES ZOSTER WITHOUT COMPLICATION: Primary | ICD-10-CM

## 2024-02-23 PROCEDURE — 99213 OFFICE O/P EST LOW 20 MIN: CPT | Performed by: FAMILY MEDICINE

## 2024-02-23 RX ORDER — VALACYCLOVIR HYDROCHLORIDE 1 G/1
1000 TABLET, FILM COATED ORAL 3 TIMES DAILY
Qty: 21 TABLET | Refills: 0 | Status: SHIPPED | OUTPATIENT
Start: 2024-02-23 | End: 2024-03-01

## 2024-03-15 ENCOUNTER — OFFICE VISIT (OUTPATIENT)
Dept: FAMILY MEDICINE CLINIC | Facility: CLINIC | Age: 34
End: 2024-03-15
Payer: COMMERCIAL

## 2024-03-15 VITALS
WEIGHT: 133.2 LBS | HEART RATE: 75 BPM | HEIGHT: 64 IN | DIASTOLIC BLOOD PRESSURE: 70 MMHG | BODY MASS INDEX: 22.74 KG/M2 | SYSTOLIC BLOOD PRESSURE: 108 MMHG | OXYGEN SATURATION: 98 % | TEMPERATURE: 97.7 F

## 2024-03-15 DIAGNOSIS — J06.9 UPPER RESPIRATORY TRACT INFECTION, UNSPECIFIED TYPE: Primary | ICD-10-CM

## 2024-03-15 PROCEDURE — 99213 OFFICE O/P EST LOW 20 MIN: CPT | Performed by: FAMILY MEDICINE

## 2024-03-15 RX ORDER — AZITHROMYCIN 250 MG/1
TABLET, FILM COATED ORAL
Qty: 6 TABLET | Refills: 0 | Status: SHIPPED | OUTPATIENT
Start: 2024-03-15 | End: 2024-03-19

## 2024-03-15 RX ORDER — BENZONATATE 100 MG/1
100 CAPSULE ORAL 3 TIMES DAILY PRN
Qty: 15 CAPSULE | Refills: 0 | Status: SHIPPED | OUTPATIENT
Start: 2024-03-15

## 2024-03-15 NOTE — PROGRESS NOTES
Name: Merry Venegas      : 1990      MRN: [id number]  Encounter Provider: Adrian Huffman DO  Encounter Date: 3/15/2024   Encounter department: [facility]    Assessment & Plan   Patient can continue Zyrtec.  Push fluids.  Rx put in for Tessalon Perles.  If symptoms continue, or get worse, start a Z-Miles.  Call us with any concerns.  1. Upper respiratory tract infection, unspecified type  -     benzonatate (TESSALON PERLES) 100 mg capsule; Take 1 capsule (100 mg total) by mouth 3 (three) times a day as needed for cough  -     azithromycin (ZITHROMAX) 250 mg tablet; Take 2 tablets today then 1 tablet daily x 4 days           Subjective     Patient here today stating  night into Monday started with congestion, sore throat and a low-grade temperature of 100 degrees.  Cough started today.  No nausea or vomiting.  Did try Zyrtec last night, also been taking DayQuil and NyQuil    Cough  This is a new problem. The current episode started in the past 7 days. The problem has been waxing and waning. The problem occurs every few minutes. The cough is Non-productive and productive of sputum. Associated symptoms include a fever (This past Monday, none since), myalgias, rhinorrhea and a sore throat (Improved). Pertinent negatives include no chest pain, chills, ear congestion or ear pain. The symptoms are aggravated by lying down.     Review of Systems   Constitutional:  Positive for fever (This past Monday, none since). Negative for chills.   HENT:  Positive for rhinorrhea and sore throat (Improved). Negative for ear pain.    Respiratory:  Positive for cough (Started this morning, dry).    Cardiovascular:  Negative for chest pain.   Musculoskeletal:  Positive for myalgias.       Past Medical History:   Diagnosis Date   • Depression    • Migraine      Past Surgical History:   Procedure Laterality Date   • WISDOM TOOTH EXTRACTION Bilateral          Family History   Problem Relation Age of Onset   •  Hypertension Mother    • Asthma Mother    • Fibromyalgia Mother    • Colon cancer Father    • No Known Problems Sister    • No Known Problems Brother    • COPD Maternal Grandmother    • No Known Problems Maternal Grandfather    • Hypothyroidism Paternal Grandmother    • Diabetes Paternal Grandfather    • Heart block Paternal Grandfather    • No Known Problems Brother      Social History     Socioeconomic History   • Marital status: /Civil Union     Spouse name: None   • Number of children: None   • Years of education: None   • Highest education level: None   Occupational History   • Occupation:    Tobacco Use   • Smoking status: Former     Passive exposure: Never   • Smokeless tobacco: Never   • Tobacco comments:     2018   Vaping Use   • Vaping status: Never Used   Substance and Sexual Activity   • Alcohol use: Yes     Comment: occassional   • Drug use: Never   • Sexual activity: Yes     Partners: Male   Other Topics Concern   • None   Social History Narrative    Wears eat belt     Social Determinants of Health     Financial Resource Strain: Not on file   Food Insecurity: Not on file   Transportation Needs: Not on file   Physical Activity: Not on file   Stress: Not on file   Social Connections: Not on file   Intimate Partner Violence: Not on file   Housing Stability: Not on file     Current Outpatient Medications on File Prior to Visit   Medication Sig   • ascorbic acid (VITAMIN C) 250 mg tablet Take 250 mg by mouth daily   • cholecalciferol (VITAMIN D3) 400 units tablet Take 400 Units by mouth daily   • Cyanocobalamin (VITAMIN B 12 PO) Take 1 tablet by mouth daily as needed   • magnesium gluconate (MAGONATE) 500 mg tablet Take 250 mg by mouth daily   • Multiple Vitamin (MULTIVITAMIN) tablet Take 1 tablet by mouth daily   • SUMAtriptan (IMITREX) 100 mg tablet Take 1 tablet (100 mg total) by mouth once as needed for migraine May repeat x1 in 2 hours, max 2/24 hours, 9/month   • LESSINA 0.1-20  "MG-MCG per tablet  (Patient not taking: Reported on 2/23/2024)   • [DISCONTINUED] escitalopram (LEXAPRO) 10 mg tablet Take 1 tablet (10 mg total) by mouth daily (Patient not taking: Reported on 2/23/2024)   • [DISCONTINUED] levonorgestrel-ethinyl estradiol (AVIANE,ALESSE,LESSINA) 0.1-20 MG-MCG per tablet Take 20 mcg by mouth daily   • [DISCONTINUED] valACYclovir (VALTREX) 1,000 mg tablet Take 1 tablet (1,000 mg total) by mouth 3 (three) times a day for 7 days     Allergies   Allergen Reactions   • Sulfamethoxazole-Trimethoprim      Other reaction(s): Other (Please comment)  Reported by Pharmacy     Immunization History   Administered Date(s) Administered   • COVID-19 MODERNA VACC 0.5 ML IM 04/06/2021, 05/06/2021   • Influenza Injectable, MDCK, Preservative Free, Quadrivalent, 0.5 mL 10/18/2019   • Influenza, injectable, quadrivalent, preservative free 0.5 mL 10/24/2018   • Tdap 09/09/2023       Objective     /70   Pulse 75   Temp 97.7 °F (36.5 °C)   Ht 5' 4\" (1.626 m)   Wt 60.4 kg (133 lb 3.2 oz)   SpO2 98%   BMI 22.86 kg/m²     Physical Exam  Vitals reviewed.   Constitutional:       General: She is not in acute distress.     Appearance: Normal appearance. She is well-developed. She is not ill-appearing, toxic-appearing or diaphoretic.   HENT:      Head: Normocephalic and atraumatic.      Nose: Congestion present.   Eyes:      Conjunctiva/sclera: Conjunctivae normal.   Cardiovascular:      Rate and Rhythm: Normal rate and regular rhythm.      Heart sounds: Normal heart sounds. No murmur heard.     No friction rub. No gallop.   Pulmonary:      Effort: Pulmonary effort is normal. No respiratory distress.      Breath sounds: Normal breath sounds. No wheezing, rhonchi or rales.   Musculoskeletal:      Right lower leg: No edema.      Left lower leg: No edema.   Neurological:      General: No focal deficit present.      Mental Status: She is alert and oriented to person, place, and time.   Psychiatric:         " Mood and Affect: Mood normal.         Behavior: Behavior normal.         Thought Content: Thought content normal.         Judgment: Judgment normal.       Adrian Huffman, DO

## 2024-03-19 ENCOUNTER — TELEPHONE (OUTPATIENT)
Dept: NEUROLOGY | Facility: CLINIC | Age: 34
End: 2024-03-19

## 2024-03-19 NOTE — TELEPHONE ENCOUNTER
Called and spoke to patient to confirm their upcoming appointment with Dr. Knapp. Informed patient about arriving in the [place] location 15 minutes prior to their appointment to get checked in and going over chart.

## 2024-03-26 ENCOUNTER — OFFICE VISIT (OUTPATIENT)
Dept: NEUROLOGY | Facility: CLINIC | Age: 34
End: 2024-03-26
Payer: COMMERCIAL

## 2024-03-26 VITALS
TEMPERATURE: 98.4 F | WEIGHT: 131 LBS | DIASTOLIC BLOOD PRESSURE: 65 MMHG | BODY MASS INDEX: 22.36 KG/M2 | HEART RATE: 63 BPM | SYSTOLIC BLOOD PRESSURE: 115 MMHG | HEIGHT: 64 IN

## 2024-03-26 DIAGNOSIS — G43.009 MIGRAINE WITHOUT AURA AND WITHOUT STATUS MIGRAINOSUS, NOT INTRACTABLE: Primary | ICD-10-CM

## 2024-03-26 PROCEDURE — 99215 OFFICE O/P EST HI 40 MIN: CPT | Performed by: PSYCHIATRY & NEUROLOGY

## 2024-03-26 NOTE — PROGRESS NOTES
[facility] Consult - Follow up   PATIENT:  Merry Venegas  MRN:  [id number]  :  1990  DATE OF SERVICE:  3/26/2024  REFERRED BY: No ref. provider found  PMD: Adrian Huffman DO    Assessment/Plan:   Merry Germain is a delightful 34 y.o. female with a past medical history that includes dysthymia/depression, migraines, vitamin-D deficiency  referred here for evaluation of headache.  My initial evaluation 2020     Migraine without aura and without status migrainosus, not intractable  Apneas without meeting criteria for MELO (home study, 2024, 4 obstructive apneas, 3 hypopneas, AURELIA 0.8, oxygen down to 88%) she is not currently interested in in lab study to further evaluate  She reports a personal history of migraines in family history of migraines.  She reports pain is typically right frontal and occipital shooting pulsating pressure and rarely on the left side.  She denies aura and reports typical associated migrainous features without associated autonomic features.  - as of 2020: 3-4 migraines a month  - as of 2020: migraines maybe 2-3 a month, not taking supplement but still seem better, rizatriptan helps But makes her tired.  Will see if prochlorperazine helps but does not make her tired.  Trial of magnesium for prevention  - as of 2021: She continues to do well with migraines to 3 times per month.  Taking magnesium for prevention and rizatriptan helps for abortive.  - as of 3/1/2022: continues to do well with about 4 migraines per month on magnesium for prevention that are milder, shorter and respond to ibuprofen, exedrin or rizatriptan.   - as of 2024: She reports increasing frequency and severity of migraines as well as new features and we discussed MRI brain to further evaluate.  She reports over the past year migraines have been worse and on average once a week but she may wake up with a dull headache on most days.  Migraines cannot  be associated with positional lightheadedness at times.  Recent eye exam June 2023 no papilledema.  Fatigue despite full nights of sleep, symptoms suggestive of possible sleep disorder and sleep study ordered to further evaluate for possible sleep disorder contributing to symptoms.  She also recently got  and is considering pregnancy in summer 2024 and we discussed the unknowns of medications in pregnancy as well as current guidelines as to what is thought to be the safest although still unknowns.  - as of 3/26/2024: She reports she took the bowl by the horns and after last visit started a journal to look for associations with dietary intake, weather, sleep etc. and migraines and made significant lifestyle changes for the better which have significantly improved her headaches.  She reports migraines about twice a month and sumatriptan 100 mg works well which she only needed a few times.  Did not make her more sleepy than rizatriptan and not currently interested in Nurtec or Ubrelvy trial which may not cause such side effects as Excedrin also helps the milder ones.  She was also to come off Lexapro and mood remains well-controlled despite stressors at home.  Continue headache preventative supplements and she did increase magnesium from 250 mg to 500 mg.  We reviewed MRI brain imaging together in detail as well as my over read which shows subtle nonspecific features.  We reviewed home sleep study which showed 4 apneas and she is currently not interested in in lab sleep study to further evaluate sleep.  Labs also looked excellent    Workup:  -MRI brain with and without contrast 1/31/2024: Per radiology No acute infarction, intracranial hemorrhage or mass.  *as of retrospective review 3/26/24 she has a slight dip in sella, in my opinion slight prominence of bilateral optic nerve sheath, ventricles appear normal, cerebellar tonsils overlie the foramen magnum with tight junction without Chiari  malformation  -1/26/2024 CMP and CBC unremarkable, vitamin D 94, B12 1025, TSH normal (we decreased vitamin B12 from 2500 mcg to 2000 mcg after this)     Preventative:  - we discussed headache hygiene and lifestyle factors that may improve headaches  -We discussed we have multiple prescription preventative options, but they are somewhat limited during pregnancy and prior to pregnancy.  Recommended at minimum increasing magnesium from 250 mg to 500 mg, adding vitamin B2  - On through the providers:  Magnesium 500 mg, vit D 5000 units, Vit C 500 mg, Vit B12 1 tab daily, Lexapro 5 mg - stopped at last visit and mood ok - normal life stressors living with family  - past/failed:  Lexapro, antihypertensive such as propranolol or verapamil would be contraindicated currently due to hypotension, amitriptyline or venlafaxine would be currently contraindicated due to interaction with Lexapro  - future options when not trying to get pregnant or chance of pregnancy:  topiramate, acetazolamide/Diamox, CGRP med, botox   -Future options when trying for pregnancy or pregnant: Cyproheptadine, cyclobenzaprine, propranolol, verapamil, amitriptyline, acetazolamide, she is not currently interested in Botox as we discussed if she were would need lead time since it takes time to kick in and she would have to try to prescription preventatives prior     Abortive:  - discussed not taking over-the-counter or prescription pain medications more than 3 days per week to prevent medication overuse/rebound headache  -   SUMAtriptan (IMITREX) 100 mg tablet; Take 1 tablet (100 mg total) by mouth once as needed for migraine May repeat x1 in 2 hours, max 2/24 hours, 9/month. Discussed proper use, possible side effects and risks.  - past/helped:  Toradol IM in the past help  - past/failed:  OTC meds, prochlorperazine helped a little only, rizatriptan helps less than it used to and sometimes needs two and can make her tired, sumatriptan   - future  options:  metoclopramide, Toradol IM or p.o., could consider trial for 5 days of Depakote or dexamethasone 4 prolonged migraine, ubrelvy, reyvow, nurtec next if needed discussed*    Patient instructions     Congratulations and keep up the strong work with all the positive lifestyle changes you made between visits!      We discussed if you would ever want an in lab sleep study just let me know as sometimes a home study can underestimate the problem and suggest false negative results.     Consider vitamin B12 2500 mcg decrease to 2000 mcg sublingual/under your tongue daily  Consider increasing her magnesium from 500 mg          -----------  For future reference:  Rescue medications thought to be likely ok during pregnancy:   First line: tylenol/acetaminophen, diphenhydramine/benadryl, Metoclopramide/Reglan, [NSAIDs (ibuprofen, naproxen and diclofenac) could be considered between 12-20 weeks only , but check with OB]  Second line:  rizatriptan/sumatriptan and other triptans, Ondansetron/zofran, prednisone short acting, prochlorperazine/compazine, promethazine/Phenergan, [also fioricet (butalbital-acetaminophen-caffeine) although I do not recommend this typically] -> instead could just take Excedrin tension which is similar to Excedrin Migraine without the aspirin  Avoid when possible: aspirin (except in those instructed to take by OB for other reasons), indomethacin, opioids   Always avoid: DHE/ergots, currently the new migraine meds have too many unknowns so we are avoiding for now until more information - lasmiditan/reyvow, Nurtec/rimegepant, Ubrelvy/Ubrogepant     These medications are thought to be compatible with breast-feeding:  Ibuprofen, acetaminophen, triptans  Probably compatible with breast-feeding:  Diphenhydramine/Benadryl, metoclopramide/Reglan, ondansetron /Zofran, if needed steroids, nurtec  prochlorperazine/Compazine  Avoid: Aspirin, DHE/ergot's,  lasmiditan/Reyvow      ---------------------------------        Headache/migraine treatment:   Abortive medications (for immediate treatment of a headache):   It is ok to take ibuprofen, acetaminophen or naproxen (Advil, Tylenol,  Aleve, Excedrin) if they help your headaches you should limit these to No more than 3 times a week to avoid medication overuse/rebound headaches.     Whenever you are considering pregnancy in the future or even prior could consider Excedrin tension instead of Excedrin Migraine without the aspirin     For your more moderate to severe migraines take this medication early   -     SUMAtriptan (IMITREX) 100 mg tablet; Take 1 tablet (100 mg total) by mouth once as needed for migraine May repeat x1 in 2 hours, max 2/24 hours, 9/month      Trial of Nurtec next or we discussed if your insurance prefers Ubrelvy would switch to that  Both of these would require a paperwork on the prior authorization but typically takes 1 to 2 weeks for your insurance to approve.  Then once it is approved there is a coupon card at each of their websites that completely covers the co-pay  *Although we are getting the ball rolling with getting Nurtec approved once you start to consider getting pregnant or at least once pregnant I would stop Nurtec just out of an abundance of precaution since we do not know what dose during pregnancy       Over the counter preventive supplements for headaches/migraines - try for 3 months   (to take every day to help prevent headaches - not to take at the time of headache):  [x] Magnesium 400mg -500 mg daily (If any diarrhea or upset stomach, decrease dose  as tolerated)  [x] Riboflavin (Vitamin B2)  400mg daily (adults) - try online   (FYI B2 may make your urine bright/neon yellow)        Prescription preventive medications for headaches/migraines   (to take every day to help prevent headaches - not to take at the time of headache):  [x] we have lots of options if needed/wanted        *Typically these types of medications take time untill you see the benefit, although some may see improvement in days, often it may take weeks, especially if the medication is being titrated up to a beneficial level. Please contact us if there are any concerns or questions regarding the medication.         Lifestyle Recommendations:  [x] SLEEP - Maintain a regular sleep schedule: Adults need at least 7-8 hours of uninterrupted a night. Maintain good sleep hygiene:  Going to bed and waking up at consistent times, avoiding excessive daytime naps, avoiding caffeinated beverages in the evening, avoid excessive stimulation in the evening and generally using bed primarily for sleeping.  One hour before bedtime would recommend turning lights down lower, decreasing your activity (may read quietly, listen to music at a low volume). When you get into bed, should eliminate all technology (no texting, emailing, playing with your phone, iPad or tablet in bed).  [x] HYDRATION - Maintain good hydration.  Drink  2L of fluid a day (4 typical small water bottles)  [x] DIET - Maintain good nutrition. In particular don't skip meals and try and eat healthy balanced meals regularly.  [x] TRIGGERS - Look for other triggers and avoid them: Limit caffeine to 1-2 cups a day or less. Avoid dietary triggers that you have noticed bring on your headaches (this could include aged cheese, peanuts, MSG, aspartame and nitrates).  [x] EXERCISE - physical exercise as we all know is good for you in many ways, and not only is good for your heart, but also is beneficial for your mental health, cognitive health and  chronic pain/headaches. I would encourage at the least 5 days of physical exercise weekly for at least 30 minutes.      Education and Follow-up  [x] Please call with any questions or concerns. Of course if any new concerning symptoms go to the emergency department.  [x] Follow up 3-6 months, sooner if needed  Certainly if you make something  for 3 to 4 months and then you do not need it you could absolutely push it back to 6 months at that time     CC:   We had the pleasure of evaluating Merry Germain in neurological consultation today. Merry Germain is a    right handed female who presents today for evaluation of headaches.      History obtained from patient as well as available medical record review.  History of Present Illness:   Interval history as of 3/26/2024  - no significant new or concerning neurologic symptoms since last visit   -1/26/2024 CMP and CBC unremarkable, vitamin D 94, B12 1025, TSH normal   - a few weeks ago had shingles under right arm along chest wall - due to stress and all related   - eye exam 9/2023 - normal, contacts and glasses and no change to prescription, no papilledema, same prescription for years in both eyes    -MRI brain with and without contrast 1/31/2024: Per radiology No acute infarction, intracranial hemorrhage or mass.  *as of retrospective review 3/26/24 she has a slight dip in sella, in my opinion slight prominence of bilateral optic nerve sheath, ventricles appear normal, cerebellar tonsils overlie the foramen magnum with tight junction without Chiari malformation    Headaches and migraines   Merry is doing better, she only gets a migraine about 2 times a month and the imitrex helps  They are milder and no longer waking up with them as much     After last visit, bought a journal and was documenting what she had in the am and meds etc and saw some patterns, hwo she felt, what she ate that day, she noticed that sugar can trigger, alcohol the next day. Cut out diet drinks and now on setlzer water, her and her  are proud of her. Less junk stress eating  Living with grandma while renovating her house and now more self control     Preventative:   -We discussed we have multiple prescription preventative options, but they are somewhat limited during pregnancy and prior to pregnancy.  Recommended at  minimum increasing magnesium from 250 mg to 500 mg, adding vitamin B2  - On through the providers:  Magnesium 500 mg, vit D 5000 units, Vit C 500 mg, Vit B12 1 tab daily, Lexapro 5 mg - stopped at last visit and mood ok - normal life stressor s living with family    Abortive:   - exedrin also helps   - Trial of sumatriptan - took only a few times and a little sleepy but not more than rizatriptan and never needed two in a day   Denies bothersome side effects        Sleep study  1/2024  Home study - felt like she couldn't fall asleep as brother was making noise and then dogs woke her up and then fell asleep and ok  The test results are from [facility].  The total time in bed (analysis time) was 561.3 minutes.  The patient had a total of 7 respiratory events made up of 4 obstructive apneas, 0 central apneas, 0 mixed apneas and 3 hypopneas resulting in a respiratory event index (AURELIA) of 0.8.  The lowest SpO2 recorded is 88%.  The snore index was 5.1%.  IMPRESSION:  All respiratory events cannot be scored when sleep is not quantified. This, together with other limitations of Home sleep testing may further compromise accuracy. If negative or equivocal, and index of suspicion remains high, a LAB based diagnostic sleep study would be warranted. Clinical correlation is advised.    Interval history as of 1/17/2024  - no significant new or concerning neurologic symptoms since last visit   -It has been almost 2 years since I last saw her  -  in Oct 2023, moved and living with grandma for now, renovating the house and selling next week, a lot of environmental changes - family planning wise - sometime maybe around summer 2024   - eye exam - June 2023 - contacts sometimes and no changes, pretty mild, -1.5, increases sometimes, none worse, no papilledema, no changes lately     Headaches and migraines   Gradually increasing over the past year  Once a week on avg   Normally lasting 8 hours, but Last week was all week     Can wake  up with a dull headache - most days   Can have lightheadedness if she gets up too fast while dealing with a migraine    Preventative:   - Magnesium 250 mg, vit D 5000 units, Vit C 500 mg, Vit B12 1 tab daily  - through other providers: lexapro 5 mg - felt like in a fog on it - that's why she stopped taking it and felt fine off of it and started it in case it was why headaches were worse     Abortive:   Exedrin migraine - maybe 2-3 times a month or more (2 cups a day)  Ibuprofen - not much  Rizatriptan - helps often but sometimes not enough and has to repeat, if she wakes up with makes her more tired but can manage  Denies bothersome side effects      - sleep - 6-8 hours, sometimes with headaches, otherwise so exhausted out like light, tired in morning, sleep not refreshing, wakes up maybe 1-2 times to pee or dogs, side or back, starts out on back, just got a new pillow, snores, every one in family snores loudly, 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  0 - would never doze  1 - slight chance of dozing  2 - moderate chance of dozing  3 - high chance of dozing    [place] sleepiness scale  Sitting and reading - 2  Watching TV - 3  Sitting, inactive in a public place such as a theater 1  As a passenger in a car for an hour without a break 1  Lying down to rest in afternoon when circumstances permit 3  Sitting and talking to someone 0  Sitting quietly after lunch without alcohol 2  In a car while stopped for few minutes in traffic -0      Interval history as of 3/1/2022  - denies any new or concerning neurologic symptoms since last visit     Headaches and migraines   - continues to do well with about 4 migraines per month that are milder than before and last shorter periods  Preventative: magnesium  Abortive:  Ibuprofen, Rizatriptan works   - prochlorperazine does not work  Denies bothersome side effects      Interval history as of 02/22/2021:  - no new concerns  Migraines are improving, not as frequent and not as severe, 2-3 a month  Prevention: magnesium   Abortive: rizatriptan helps But makes her tired.    - prochlorperazine does not work as well     Interval history as of 9/21/2020:  Migraines - as of 9/21/2020: maybe 2-3 a month, not taking supplements, rizatriptan helps   Prevention:  Headache preventative supplements  Abortive:  trial of rizatriptan - working well, used 8 in 3 months, makes her a little sleepy         Headaches started at what age? More so in mid 20s years old  How often do the headaches occur?   - as of 6/17/2020: 3-4 migraines a month  - as of 9/21/2020: maybe 2-3 a month, not taking supplements, rizatriptan helps   What time of the day do the headaches start?  Often wakes up with it   How long do the headaches last? 1-2 days, Up to 2 weeks   Are you ever headache free? Yes     Aura? without aura     Last eye exam:   1/2020 - normal except for glasses      Where is your headache located and pain quality?   - right frontal and occipital - shooting, pulsating, pressure  - rarely on the left side      What is the intensity of pain? Average: 4-5/10, worst 6-7/10  Associated symptoms:   [x] Nausea       [] Vomiting        [] Diarrhea   [x] Stiff or sore neck   [x] Problems with concentration  [x] Photophobia  >   [x]Phonophobia      [] Osmophobia  [x] Blurred vision - maybe slight  [x] Prefer quiet, dark room  [] Light-headed or dizzy     [] Tinnitus    [] Hands or feet tingle or feel numb/paresthesias       [] Red ear      [] Ptosis      [] Facial droop  [] Lacrimation - both eyes without migraine  [] Nasal congestion- pressure sometimes without migraine    [] Flushing of face  [] Change in pupil size        Things that make the headache worse? No specific movements, any movement      Headache triggers:   menses      Have you seen someone else for headaches or pain? Yes, PCP  Have you had  trigger point injection performed and how often? No  Have you had Botox injection performed and how often? No   Have you had epidural injections or transforaminal injections performed? No  Are you current pregnant or planning on getting pregnant? No plans, TBD, on birth control   Have you ever had any Brain imaging? no     What medications do you take or have you taken for your headaches?   ABORTIVE:    OTC medications have been ineffective      Excedrin - helps a little      Past:   toradol IM - helped      PREVENTIVE:   -     For mood:  Lexapro 10 mg - over a year and helping      Past:  Antihypertensive such as verapamil or propranolol be contraindicated due to hypotension at this time     Alternative therapies used in the past for headaches? no     LIFESTYLE  Sleep   - averages: 7-9 hours  Problems falling asleep?:   No  Problems staying asleep?:  No     Physical activity: 3 days a week      Water: 1 gallon per day  Caffeine: 2 cups per day     Mood: dysthmia  - counseling when younger      The following portions of the patient's history were reviewed and updated as appropriate: allergies, current medications, past family history, past medical history, past social history, past surgical history and problem list.     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  Education: BS  Lives with boyfriend and dog      Illicit Drugs: denies  Alcohol/tobacco: quit tobacco 2018, social alcohol     Past Medical History:     Past Medical History:   Diagnosis Date    Depression     Migraine        Patient Active Problem List   Diagnosis    Migraine headache    Dysthymia    Migraine without aura and without status migrainosus, not intractable    MELO (obstructive sleep apnea)       Medications:      Current Outpatient Medications   Medication Sig  Dispense Refill    ascorbic acid (VITAMIN C) 250 mg tablet Take 250 mg by mouth daily      benzonatate (TESSALON PERLES) 100 mg capsule Take 1 capsule (100 mg total) by mouth 3 (three) times a day as needed for cough 15 capsule 0    cholecalciferol (VITAMIN D3) 400 units tablet Take 400 Units by mouth daily      Cyanocobalamin (VITAMIN B 12 PO) Take 1 tablet by mouth daily      LESSINA 0.1-20 MG-MCG per tablet       magnesium gluconate (MAGONATE) 500 mg tablet Take 500 mg by mouth daily      Multiple Vitamin (MULTIVITAMIN) tablet Take 1 tablet by mouth daily      SUMAtriptan (IMITREX) 100 mg tablet Take 1 tablet (100 mg total) by mouth once as needed for migraine May repeat x1 in 2 hours, max 2/24 hours, 9/month 9 tablet 11     No current facility-administered medications for this visit.        Allergies:      Allergies   Allergen Reactions    Sulfamethoxazole-Trimethoprim      Other reaction(s): Other (Please comment)  Reported by Pharmacy       Family History:     Family History   Problem Relation Age of Onset    Hypertension Mother     Asthma Mother     Fibromyalgia Mother     Colon cancer Father     No Known Problems Sister     No Known Problems Brother     COPD Maternal Grandmother     No Known Problems Maternal Grandfather     Hypothyroidism Paternal Grandmother     Diabetes Paternal Grandfather     Heart block Paternal Grandfather     No Known Problems Brother        Social History:     Social History     Socioeconomic History    Marital status: /Civil Union     Spouse name: Not on file    Number of children: Not on file    Years of education: Not on file    Highest education level: Not on file   Occupational History    Occupation:    Tobacco Use    Smoking status: Former     Passive exposure: Never    Smokeless tobacco: Never    Tobacco comments:     2018   Vaping Use    Vaping status: Never Used   Substance and Sexual Activity    Alcohol use: Yes     Comment: occassional    Drug use:  "Never    Sexual activity: Yes     Partners: Male   Other Topics Concern    Not on file   Social History Narrative    Wears eat belt     Social Determinants of Health     Financial Resource Strain: Not on file   Food Insecurity: Not on file   Transportation Needs: Not on file   Physical Activity: Not on file   Stress: Not on file   Social Connections: Not on file   Intimate Partner Violence: Not on file   Housing Stability: Not on file         Objective:         Physical Exam:                                                                 Vitals:            Constitutional:    /65 (BP Location: Right arm, Patient Position: Sitting, Cuff Size: Standard)   Pulse 63   Temp 98.4 °F (36.9 °C) (Temporal)   Ht 5' 4\" (1.626 m)   Wt 59.4 kg (131 lb)   BMI 22.49 kg/m²   BP Readings from Last 3 Encounters:   03/26/24 115/65   03/15/24 108/70   02/23/24 126/68     Pulse Readings from Last 3 Encounters:   03/26/24 63   03/15/24 75   02/23/24 69         Well developed, well nourished, well groomed.        Psychiatric:  Normal behavior and appropriate affect        Neurological Examination:     Mental status/cognitive function:   Recent and remote memory appear intact. Attention span and concentration as well as fund of knowledge are appropriate for age. Normal language and spontaneous speech.  Cranial Nerves:   VII-facial expression symmetric  Motor Exam: symmetric bulk throughout. no atrophy, fasciculations or abnormal movements noted.   Coordination:  no apparent dysmetria, ataxia or tremor noted  Gait: steady casual gait          Pertinent lab results:   See EMR for recent labs  -1/26/2024 CMP and CBC unremarkable, vitamin D 94, B12 1025, TSH normal   -7/20/2021   05/27/2020 CMP and CBC unremarkable, TSH normal     Imaging:   I have personally reviewed imaging and radiology read   -MRI brain with and without contrast 1/31/2024: Per radiology No acute infarction, intracranial hemorrhage or mass.  *as of " retrospective review 3/26/24 she has a slight dip in sella, in my opinion slight prominence of bilateral optic nerve sheath, ventricles appear normal, cerebellar tonsils overlie the foramen magnum with tight junction without Chiari malformation  Review of Systems:   ROS obtained by medical assistant and personally reviewed, but if any symptoms listed below say negative, does not mean patient has not had this symptom since last visit, please see HPI for details of symptoms discussed this visit. I recommended PCP follow up for non neurologic problems.    Review of Systems   Constitutional:  Negative for appetite change and fever.   HENT: Negative.  Negative for hearing loss, tinnitus, trouble swallowing and voice change.    Eyes: Negative.  Negative for photophobia and pain.   Respiratory: Negative.  Negative for shortness of breath.    Cardiovascular: Negative.  Negative for palpitations.   Gastrointestinal: Negative.  Negative for nausea and vomiting.   Endocrine: Negative.  Negative for cold intolerance.   Genitourinary: Negative.  Negative for dysuria, frequency and urgency.   Musculoskeletal: Negative.  Negative for myalgias and neck pain.   Skin: Negative.  Negative for rash.   Neurological:  Positive for headaches. Negative for dizziness, tremors, seizures, syncope, facial asymmetry, speech difficulty, weakness, light-headedness and numbness.   Hematological: Negative.  Does not bruise/bleed easily.   Psychiatric/Behavioral: Negative.  Negative for confusion, hallucinations and sleep disturbance.    All other systems reviewed and are negative.    I have spent 36 minutes with Patient  today in which greater than 50% of this time was spent in counseling/coordination of care regarding Diagnostic results, Prognosis, Risks and benefits of tx options, Instructions for management, Patient and family education, Importance of tx compliance, Risk factor reductions, Impressions, Counseling / Coordination of care,  Documenting in the medical record, Reviewing / ordering tests, medicine, procedures  , Obtaining or reviewing history  , and Communicating with other healthcare professionals . I also spent 11 minutes non face to face for this patient the same day.       Author:  Argenis Knapp MD 3/26/2024 3:58 PM

## 2024-03-26 NOTE — PATIENT INSTRUCTIONS
Consider vitamin B12 2500 mcg decrease to 2000 mcg sublingual/under your tongue daily  Consider increasing her magnesium from 500 mg      For future reference:  Rescue medications thought to be likely ok during pregnancy:   First line: tylenol/acetaminophen, diphenhydramine/benadryl, Metoclopramide/Reglan, [NSAIDs (ibuprofen, naproxen and diclofenac) could be considered between 12-20 weeks only , but check with OB]  Second line:  rizatriptan/sumatriptan and other triptans, Ondansetron/zofran, prednisone short acting, prochlorperazine/compazine, promethazine/Phenergan, [also fioricet (butalbital-acetaminophen-caffeine) although I do not recommend this typically] -> instead could just take Excedrin tension which is similar to Excedrin Migraine without the aspirin  Avoid when possible: aspirin (except in those instructed to take by OB for other reasons), indomethacin, opioids   Always avoid: DHE/ergots, currently the new migraine meds have too many unknowns so we are avoiding for now until more information - lasmiditan/reyvow, Nurtec/rimegepant, Ubrelvy/Ubrogepant     These medications are thought to be compatible with breast-feeding:  Ibuprofen, acetaminophen, triptans  Probably compatible with breast-feeding:  Diphenhydramine/Benadryl, metoclopramide/Reglan, ondansetron /Zofran, if needed steroids, nurtec  prochlorperazine/Compazine  Avoid: Aspirin, DHE/ergot's, lasmiditan/Reyvow      ---------------------------------    We discussed if you would ever want an in lab sleep study just let me know as sometimes a home study can underestimate the problem and suggest false negative results.         Headache/migraine treatment:   Abortive medications (for immediate treatment of a headache):   It is ok to take ibuprofen, acetaminophen or naproxen (Advil, Tylenol,  Aleve, Excedrin) if they help your headaches you should limit these to No more than 3 times a week to avoid medication overuse/rebound headaches.     Whenever you  are considering pregnancy in the future or even prior could consider Excedrin tension instead of Excedrin Migraine without the aspirin     For your more moderate to severe migraines take this medication early   -     SUMAtriptan (IMITREX) 100 mg tablet; Take 1 tablet (100 mg total) by mouth once as needed for migraine May repeat x1 in 2 hours, max 2/24 hours, 9/month      Trial of Nurtec next or we discussed if your insurance prefers Ubrelvy would switch to that  Both of these would require a paperwork on the prior authorization but typically takes 1 to 2 weeks for your insurance to approve.  Then once it is approved there is a coupon card at each of their websites that completely covers the co-pay  *Although we are getting the ball rolling with getting Nurtec approved once you start to consider getting pregnant or at least once pregnant I would stop Nurtec just out of an abundance of precaution since we do not know what dose during pregnancy       Over the counter preventive supplements for headaches/migraines - try for 3 months   (to take every day to help prevent headaches - not to take at the time of headache):  [x] Magnesium 400mg -500 mg daily (If any diarrhea or upset stomach, decrease dose  as tolerated)  [x] Riboflavin (Vitamin B2)  400mg daily (adults) - try online   (FYI B2 may make your urine bright/neon yellow)        Prescription preventive medications for headaches/migraines   (to take every day to help prevent headaches - not to take at the time of headache):  [x] we have lots of options if needed/wanted       *Typically these types of medications take time untill you see the benefit, although some may see improvement in days, often it may take weeks, especially if the medication is being titrated up to a beneficial level. Please contact us if there are any concerns or questions regarding the medication.         Lifestyle Recommendations:  [x] SLEEP - Maintain a regular sleep schedule: Adults need at  least 7-8 hours of uninterrupted a night. Maintain good sleep hygiene:  Going to bed and waking up at consistent times, avoiding excessive daytime naps, avoiding caffeinated beverages in the evening, avoid excessive stimulation in the evening and generally using bed primarily for sleeping.  One hour before bedtime would recommend turning lights down lower, decreasing your activity (may read quietly, listen to music at a low volume). When you get into bed, should eliminate all technology (no texting, emailing, playing with your phone, iPad or tablet in bed).  [x] HYDRATION - Maintain good hydration.  Drink  2L of fluid a day (4 typical small water bottles)  [x] DIET - Maintain good nutrition. In particular don't skip meals and try and eat healthy balanced meals regularly.  [x] TRIGGERS - Look for other triggers and avoid them: Limit caffeine to 1-2 cups a day or less. Avoid dietary triggers that you have noticed bring on your headaches (this could include aged cheese, peanuts, MSG, aspartame and nitrates).  [x] EXERCISE - physical exercise as we all know is good for you in many ways, and not only is good for your heart, but also is beneficial for your mental health, cognitive health and  chronic pain/headaches. I would encourage at the least 5 days of physical exercise weekly for at least 30 minutes.      Education and Follow-up  [x] Please call with any questions or concerns. Of course if any new concerning symptoms go to the emergency department.  [x] Follow up 3-6 months, sooner if needed  Certainly if you make something for 3 to 4 months and then you do not need it you could absolutely push it back to 6 months at that time

## 2024-08-05 ENCOUNTER — OFFICE VISIT (OUTPATIENT)
Dept: NEUROLOGY | Facility: CLINIC | Age: 34
End: 2024-08-05
Payer: COMMERCIAL

## 2024-08-05 VITALS
BODY MASS INDEX: 22.36 KG/M2 | HEIGHT: 64 IN | SYSTOLIC BLOOD PRESSURE: 118 MMHG | HEART RATE: 73 BPM | WEIGHT: 131 LBS | DIASTOLIC BLOOD PRESSURE: 68 MMHG

## 2024-08-05 DIAGNOSIS — G43.009 MIGRAINE WITHOUT AURA AND WITHOUT STATUS MIGRAINOSUS, NOT INTRACTABLE: Primary | ICD-10-CM

## 2024-08-05 PROBLEM — G47.33 OSA (OBSTRUCTIVE SLEEP APNEA): Status: RESOLVED | Noted: 2024-01-29 | Resolved: 2024-08-05

## 2024-08-05 PROCEDURE — 99214 OFFICE O/P EST MOD 30 MIN: CPT | Performed by: PSYCHIATRY & NEUROLOGY

## 2024-08-05 RX ORDER — SUMATRIPTAN 100 MG/1
100 TABLET, FILM COATED ORAL ONCE AS NEEDED
Qty: 9 TABLET | Refills: 11 | Status: SHIPPED | OUTPATIENT
Start: 2024-08-05

## 2024-08-05 NOTE — PATIENT INSTRUCTIONS
Congratulations and keep up the strong work with all the positive lifestyle changes you made between visits!    We discussed if you would ever want an in lab sleep study just let me know as sometimes a home study can underestimate the problem and suggest false negative results.     Consider vitamin B12  2000 mcg sublingual/under your tongue daily      -----------  For future reference:  Rescue medications thought to be likely ok during pregnancy:   First line: tylenol/acetaminophen, diphenhydramine/benadryl, Metoclopramide/Reglan, [NSAIDs (ibuprofen, naproxen and diclofenac) could be considered between 12-20 weeks only , but check with OB]  Second line:  rizatriptan/sumatriptan and other triptans, Ondansetron/zofran, prednisone short acting, prochlorperazine/compazine, promethazine/Phenergan, [also fioricet (butalbital-acetaminophen-caffeine) although I do not recommend this typically] -> instead could just take Excedrin tension which is similar to Excedrin Migraine without the aspirin  Avoid when possible: aspirin (except in those instructed to take by OB for other reasons), indomethacin, opioids   Always avoid: DHE/ergots, currently the new migraine meds have too many unknowns so we are avoiding for now until more information - lasmiditan/reyvow, Nurtec/rimegepant, Ubrelvy/Ubrogepant     These medications are thought to be compatible with breast-feeding:  Ibuprofen, acetaminophen, triptans  Probably compatible with breast-feeding:  Diphenhydramine/Benadryl, metoclopramide/Reglan, ondansetron /Zofran, if needed steroids, nurtec  prochlorperazine/Compazine  Avoid: Aspirin, DHE/ergot's, lasmiditan/Reyvow      ---------------------------------        Headache/migraine treatment:   Abortive medications (for immediate treatment of a headache):   It is ok to take ibuprofen, acetaminophen or naproxen (Advil, Tylenol,  Aleve, Excedrin) if they help your headaches you should limit these to No more than 3 times a week to  avoid medication overuse/rebound headaches.     Whenever you are considering pregnancy in the future or even prior could consider Excedrin tension instead of Excedrin Migraine without the aspirin     For your more moderate to severe migraines take this medication early   -     SUMAtriptan (IMITREX) 100 mg tablet; Take 1 tablet (100 mg total) by mouth once as needed for migraine May repeat x1 in 2 hours, max 2/24 hours, 9/month      Trial of Nurtec next or we discussed if your insurance prefers Ubrelvy would switch to that  Both of these would require a paperwork on the prior authorization but typically takes 1 to 2 weeks for your insurance to approve.  Then once it is approved there is a coupon card at each of their websites that completely covers the co-pay  *Although we are getting the ball rolling with getting Nurtec approved once you start to consider getting pregnant or at least once pregnant I would stop Nurtec just out of an abundance of precaution since we do not know what dose during pregnancy       Over the counter preventive supplements for headaches/migraines - try for 3 months   (to take every day to help prevent headaches - not to take at the time of headache):  [x] Magnesium 400mg -500 mg daily (If any diarrhea or upset stomach, decrease dose  as tolerated)  [] Riboflavin (Vitamin B2)  400mg daily - try online   (FYI B2 may make your urine bright/neon yellow)        Prescription preventive medications for headaches/migraines   (to take every day to help prevent headaches - not to take at the time of headache):  [x] we have lots of options if needed/wanted       *Typically these types of medications take time untill you see the benefit, although some may see improvement in days, often it may take weeks, especially if the medication is being titrated up to a beneficial level. Please contact us if there are any concerns or questions regarding the medication.         Lifestyle Recommendations:  [x] SLEEP  - Maintain a regular sleep schedule: Adults need at least 7-8 hours of uninterrupted a night. Maintain good sleep hygiene:  Going to bed and waking up at consistent times, avoiding excessive daytime naps, avoiding caffeinated beverages in the evening, avoid excessive stimulation in the evening and generally using bed primarily for sleeping.  One hour before bedtime would recommend turning lights down lower, decreasing your activity (may read quietly, listen to music at a low volume). When you get into bed, should eliminate all technology (no texting, emailing, playing with your phone, iPad or tablet in bed).  [x] HYDRATION - Maintain good hydration.  Drink  2L of fluid a day (4 typical small water bottles)  [x] DIET - Maintain good nutrition. In particular don't skip meals and try and eat healthy balanced meals regularly.  [x] TRIGGERS - Look for other triggers and avoid them: Limit caffeine to 1-2 cups a day or less. Avoid dietary triggers that you have noticed bring on your headaches (this could include aged cheese, peanuts, MSG, aspartame and nitrates).  [x] EXERCISE - physical exercise as we all know is good for you in many ways, and not only is good for your heart, but also is beneficial for your mental health, cognitive health and  chronic pain/headaches. I would encourage at the least 5 days of physical exercise weekly for at least 30 minutes.      Education and Follow-up  [x] Please call with any questions or concerns. Of course if any new concerning symptoms go to the emergency department.  [x] Follow up 6 months, sooner if needed  If you are still doing as well at 6 months you could absolutely push it back to 1 year

## 2024-08-05 NOTE — PROGRESS NOTES
Review of Systems   Constitutional:  Negative for appetite change, fatigue and fever.   HENT: Negative.  Negative for hearing loss, tinnitus, trouble swallowing and voice change.    Eyes: Negative.  Negative for photophobia, pain and visual disturbance.   Respiratory: Negative.  Negative for shortness of breath.    Cardiovascular: Negative.  Negative for palpitations.   Gastrointestinal: Negative.  Negative for nausea and vomiting.   Endocrine: Negative.  Negative for cold intolerance.   Genitourinary: Negative.  Negative for dysuria, frequency and urgency.   Musculoskeletal:  Negative for back pain, gait problem, myalgias, neck pain and neck stiffness.   Skin: Negative.  Negative for rash.   Allergic/Immunologic: Negative.    Neurological:  Positive for headaches (migraines  3 to 4 a month). Negative for dizziness, tremors, seizures, syncope, facial asymmetry, speech difficulty, weakness, light-headedness and numbness.   Hematological: Negative.  Does not bruise/bleed easily.   Psychiatric/Behavioral: Negative.  Negative for confusion, hallucinations and sleep disturbance.

## 2024-08-05 NOTE — PROGRESS NOTES
[facility] Consult - Follow up   PATIENT:  Merry Venegas  MRN:  [id number]  :  1990  DATE OF SERVICE:  2024  REFERRED BY: No ref. provider found  PMD: Adrian Huffman DO    Assessment/Plan:   Merry Germain is a delightful 34 y.o. female with a past medical history that includes dysthymia/depression, migraines, vitamin-D deficiency  referred here for evaluation of headache.  My initial evaluation 2020     Migraine without aura and without status migrainosus, not intractable  Apneas without meeting criteria for MELO (home study, 2024, 7 total with 4 obstructive apneas, 3 hypopneas, AURELIA 0.8, oxygen down to 88%) she is not currently interested in in lab study to further evaluate  She reports a personal history of migraines in family history of migraines.  She reports pain is typically right frontal and occipital shooting pulsating pressure and rarely on the left side.  She denies aura and reports typical associated migrainous features without associated autonomic features.  - as of 2020: 3-4 migraines a month  - as of 2020: migraines maybe 2-3 a month, not taking supplement but still seem better, rizatriptan helps But makes her tired.  Will see if prochlorperazine helps but does not make her tired.  Trial of magnesium for prevention  - as of 2021: She continues to do well with migraines to 3 times per month.  Taking magnesium for prevention and rizatriptan helps for abortive.  - as of 3/1/2022: continues to do well with about 4 migraines per month on magnesium for prevention that are milder, shorter and respond to ibuprofen, exedrin or rizatriptan.   - as of 2024: She reports increasing frequency and severity of migraines as well as new features and we discussed MRI brain to further evaluate.  She reports over the past year migraines have been worse and on average once a week but she may wake up with a dull headache on most days.   Migraines cannot be associated with positional lightheadedness at times.  Recent eye exam June 2023 no papilledema.  Fatigue despite full nights of sleep, symptoms suggestive of possible sleep disorder and sleep study ordered to further evaluate for possible sleep disorder contributing to symptoms.  She also recently got  and is considering pregnancy in summer 2024 and we discussed the unknowns of medications in pregnancy as well as current guidelines as to what is thought to be the safest although still unknowns.  - as of 3/26/2024: She reports she took the bull by the [employer] and after last visit started a journal to look for associations with dietary intake, weather, sleep etc. and migraines and made significant lifestyle changes for the better which have significantly improved her headaches.  She reports migraines about twice a month and sumatriptan 100 mg works well which she only needed a few times.  Did not make her more sleepy than rizatriptan and not currently interested in Nurtec or Ubrelvy trial which may not cause such side effects as Excedrin also helps the milder ones.  She was also to come off Lexapro and mood remains well-controlled despite stressors at home.  Continue headache preventative supplements and she did increase magnesium from 250 mg to 500 mg.  We reviewed MRI brain imaging together in detail as well as my over read which shows subtle nonspecific features.  We reviewed home sleep study which showed 4 apneas and she is currently not interested in in lab sleep study to further evaluate sleep.  Labs also looked excellent  - as of 8/5/2024: She reports she continues to have improvement with the lifestyle changes and since last visit they moved from her parents house to their own house and this has also helped decrease stress and improved headaches and migraines.  She reports 3-4 a month on average and typically sumatriptan/Imitrex works.  Excedrin helps the milder ones but she is not  taking this if she knows she is can eat sumatriptan which is great.  We discussed the unknowns of medications during pregnancy which she is considering in the next year, but she is considering coming off birth control sooner as she went without it for a month on accident and felt much better generally and without migraines.  Continue magnesium 500 mg and if wanted could consider vitamin B2/riboflavin 400 mg.  She is aware we have multiple other medications if ever wanted/needed and is happy she is doing better than previously and not needing these currently.    Workup:  -MRI brain with and without contrast 1/31/2024: Per radiology No acute infarction, intracranial hemorrhage or mass.  *as of retrospective review 3/26/24 she has a slight dip in sella, in my opinion slight prominence of bilateral optic nerve sheath, ventricles appear normal, cerebellar tonsils overlie the foramen magnum with tight junction without Chiari malformation  -1/26/2024 CMP and CBC unremarkable, vitamin D 94, B12 1025, TSH normal (we decreased vitamin B12 from 2500 mcg to 2000 mcg after this)     Preventative:  - we discussed headache hygiene and lifestyle factors that may improve headaches  - We discussed we have multiple prescription preventative options, but they are somewhat limited during pregnancy and prior to pregnancy.   In the past Recommended increasing magnesium from 250 mg to 500 mg, adding vitamin B2 -not started but could  - On through the providers or OTC:  Magnesium 500 mg, vit D 5000 units, Vit C 500 mg, Vit B12 2000 mcg daily  - past/failed:  Lexapro, antihypertensive such as propranolol or verapamil would be contraindicated currently due to hypotension, amitriptyline or , Lexapro 5 mg stopped early 2024  - future options when not trying to get pregnant or chance of pregnancy:  topiramate, acetazolamide/Diamox, CGRP med, botox   -Future options when trying for pregnancy or pregnant: Cyproheptadine, cyclobenzaprine,  propranolol, verapamil, amitriptyline, acetazolamide, she is not currently interested in Botox as we discussed if she were would need lead time since it takes time to kick in and she would have to try to prescription preventatives prior     Abortive:  - discussed not taking over-the-counter or prescription pain medications more than 3 days per week to prevent medication overuse/rebound headache  -   SUMAtriptan (IMITREX) 100 mg tablet; Take 1 tablet (100 mg total) by mouth once as needed for migraine May repeat x1 in 2 hours, max 2/24 hours, 9/month. Discussed proper use, possible side effects and risks.  - past/helped:  Toradol IM in the past helped  - past/failed:  OTC meds, prochlorperazine helped a little only, rizatriptan helps less than it used to and sometimes needs two and can make her tired, sumatriptan   - future options:  metoclopramide, Toradol IM or p.o., could consider trial for 5 days of Depakote or dexamethasone 4 prolonged migraine, ubrelvy, reyvow, nurtec next if needed discussed*    Patient instructions     Congratulations and keep up the strong work with all the positive lifestyle changes you made between visits!    We discussed if you would ever want an in lab sleep study just let me know as sometimes a home study can underestimate the problem and suggest false negative results.     Consider vitamin B12  2000 mcg sublingual/under your tongue daily      -----------  For future reference:  Rescue medications thought to be likely ok during pregnancy:   First line: tylenol/acetaminophen, diphenhydramine/benadryl, Metoclopramide/Reglan, [NSAIDs (ibuprofen, naproxen and diclofenac) could be considered between 12-20 weeks only , but check with OB]  Second line:  rizatriptan/sumatriptan and other triptans, Ondansetron/zofran, prednisone short acting, prochlorperazine/compazine, promethazine/Phenergan, [also fioricet (butalbital-acetaminophen-caffeine) although I do not recommend this typically] ->  instead could just take Excedrin tension which is similar to Excedrin Migraine without the aspirin  Avoid when possible: aspirin (except in those instructed to take by OB for other reasons), indomethacin, opioids   Always avoid: DHE/ergots, currently the new migraine meds have too many unknowns so we are avoiding for now until more information - lasmiditan/reyvow, Nurtec/rimegepant, Ubrelvy/Ubrogepant     These medications are thought to be compatible with breast-feeding:  Ibuprofen, acetaminophen, triptans  Probably compatible with breast-feeding:  Diphenhydramine/Benadryl, metoclopramide/Reglan, ondansetron /Zofran, if needed steroids, nurtec  prochlorperazine/Compazine  Avoid: Aspirin, DHE/ergot's, lasmiditan/Reyvow      ---------------------------------        Headache/migraine treatment:   Abortive medications (for immediate treatment of a headache):   It is ok to take ibuprofen, acetaminophen or naproxen (Advil, Tylenol,  Aleve, Excedrin) if they help your headaches you should limit these to No more than 3 times a week to avoid medication overuse/rebound headaches.     Whenever you are considering pregnancy in the future or even prior could consider Excedrin tension instead of Excedrin Migraine without the aspirin     For your more moderate to severe migraines take this medication early   -     SUMAtriptan (IMITREX) 100 mg tablet; Take 1 tablet (100 mg total) by mouth once as needed for migraine May repeat x1 in 2 hours, max 2/24 hours, 9/month      Trial of Nurtec next or we discussed if your insurance prefers Ubrelvy would switch to that  Both of these would require a paperwork on the prior authorization but typically takes 1 to 2 weeks for your insurance to approve.  Then once it is approved there is a coupon card at each of their websites that completely covers the co-pay  *Although we are getting the ball rolling with getting Nurtec approved once you start to consider getting pregnant or at least once  pregnant I would stop Nurtec just out of an abundance of precaution since we do not know what dose during pregnancy       Over the counter preventive supplements for headaches/migraines - try for 3 months   (to take every day to help prevent headaches - not to take at the time of headache):  [x] Magnesium 400mg -500 mg daily (If any diarrhea or upset stomach, decrease dose  as tolerated)  [] Riboflavin (Vitamin B2)  400mg daily - try online   (FYI B2 may make your urine bright/neon yellow)        Prescription preventive medications for headaches/migraines   (to take every day to help prevent headaches - not to take at the time of headache):  [x] we have lots of options if needed/wanted       *Typically these types of medications take time untill you see the benefit, although some may see improvement in days, often it may take weeks, especially if the medication is being titrated up to a beneficial level. Please contact us if there are any concerns or questions regarding the medication.         Lifestyle Recommendations:  [x] SLEEP - Maintain a regular sleep schedule: Adults need at least 7-8 hours of uninterrupted a night. Maintain good sleep hygiene:  Going to bed and waking up at consistent times, avoiding excessive daytime naps, avoiding caffeinated beverages in the evening, avoid excessive stimulation in the evening and generally using bed primarily for sleeping.  One hour before bedtime would recommend turning lights down lower, decreasing your activity (may read quietly, listen to music at a low volume). When you get into bed, should eliminate all technology (no texting, emailing, playing with your phone, iPad or tablet in bed).  [x] HYDRATION - Maintain good hydration.  Drink  2L of fluid a day (4 typical small water bottles)  [x] DIET - Maintain good nutrition. In particular don't skip meals and try and eat healthy balanced meals regularly.  [x] TRIGGERS - Look for other triggers and avoid them: Limit  caffeine to 1-2 cups a day or less. Avoid dietary triggers that you have noticed bring on your headaches (this could include aged cheese, peanuts, MSG, aspartame and nitrates).  [x] EXERCISE - physical exercise as we all know is good for you in many ways, and not only is good for your heart, but also is beneficial for your mental health, cognitive health and  chronic pain/headaches. I would encourage at the least 5 days of physical exercise weekly for at least 30 minutes.      Education and Follow-up  [x] Please call with any questions or concerns. Of course if any new concerning symptoms go to the emergency department.  [x] Follow up 6 months, sooner if needed  If you are still doing as well at 6 months you could absolutely push it back to 1 year       CC:   We had the pleasure of evaluating Merry Germain in neurological consultation today. Merry Germain is a    right handed female who presents today for evaluation of headaches.      History obtained from patient as well as available medical record review.  History of Present Illness:   Interval history as of 8/5/2024  - no significant new or concerning neurologic symptoms since last visit   - triggers - one beer will wake up the next day with a headache and then strong processed glucose, moved out of parents and headaches improved   - in lab sleep study available but has been sleeping better in her own home - 7-8 hours   - in the process of moving - BC got off cycle going a month without it and no migraines and felt different off of it - not currently considering pregnancy but sometime in the next year     Headaches and migraines   She reports she feels migraines are currently stable at 3-4 migraines a month and the milder ones respond to Excedrin and the more migrainous ones to sumatriptan       Preventative:   -Magnesium 500 mg, not taking vitamin B2   - On through the providers:  Magnesium 500 mg, vit D 5000 units, Vit C 500 mg, Vit B12 1 tab  daily    Abortive:   -Excedrin typically helps the milder ones  sumatriptan 100 mg works well - rarely needs a second maybe sometimes the next day   Denies bothersome side effects      Interval history as of 3/26/2024  - no significant new or concerning neurologic symptoms since last visit   -1/26/2024 CMP and CBC unremarkable, vitamin D 94, B12 1025, TSH normal   - a few weeks ago had shingles under right arm along chest wall - due to stress and all related   - eye exam 9/2023 - normal, contacts and glasses and no change to prescription, no papilledema, same prescription for years in both eyes    -MRI brain with and without contrast 1/31/2024: Per radiology No acute infarction, intracranial hemorrhage or mass.  *as of retrospective review 3/26/24 she has a slight dip in sella, in my opinion slight prominence of bilateral optic nerve sheath, ventricles appear normal, cerebellar tonsils overlie the foramen magnum with tight junction without Chiari malformation    Headaches and migraines   Merry is doing better, she only gets a migraine about 2 times a month and the imitrex helps  They are milder and no longer waking up with them as much     After last visit, bought a journal and was documenting what she had in the am and meds etc and saw some patterns, hwo she felt, what she ate that day, she noticed that sugar can trigger, alcohol the next day. Cut out diet drinks and now on setlzer water, her and her  are proud of her. Less junk stress eating  Living with grandma while renovating her house and now more self control     Preventative:   -We discussed we have multiple prescription preventative options, but they are somewhat limited during pregnancy and prior to pregnancy.  Recommended at minimum increasing magnesium from 250 mg to 500 mg, adding vitamin B2  - On through the providers:  Magnesium 500 mg, vit D 5000 units, Vit C 500 mg, Vit B12 1 tab daily, Lexapro 5 mg - stopped at last visit and mood ok -  normal life stressor s living with family    Abortive:   - exedrin also helps   - Trial of sumatriptan - took only a few times and a little sleepy but not more than rizatriptan and never needed two in a day   Denies bothersome side effects        Sleep study  1/2024  Home study - felt like she couldn't fall asleep as brother was making noise and then dogs woke her up and then fell asleep and ok  The test results are from [facility].  The total time in bed (analysis time) was 561.3 minutes.  The patient had a total of 7 respiratory events made up of 4 obstructive apneas, 0 central apneas, 0 mixed apneas and 3 hypopneas resulting in a respiratory event index (AURELIA) of 0.8.  The lowest SpO2 recorded is 88%.  The snore index was 5.1%.  IMPRESSION:  All respiratory events cannot be scored when sleep is not quantified. This, together with other limitations of Home sleep testing may further compromise accuracy. If negative or equivocal, and index of suspicion remains high, a LAB based diagnostic sleep study would be warranted. Clinical correlation is advised.    Interval history as of 1/17/2024  - no significant new or concerning neurologic symptoms since last visit   -It has been almost 2 years since I last saw her  -  in Oct 2023, moved and living with grandma for now, renovating the house and selling next week, a lot of environmental changes - family planning wise - sometime maybe around summer 2024   - eye exam - June 2023 - contacts sometimes and no changes, pretty mild, -1.5, increases sometimes, none worse, no papilledema, no changes lately     Headaches and migraines   Gradually increasing over the past year  Once a week on avg   Normally lasting 8 hours, but Last week was all week     Can wake up with a dull headache - most days   Can have lightheadedness if she gets up too fast while dealing with a migraine    Preventative:   - Magnesium 250 mg, vit D 5000 units, Vit C 500 mg, Vit B12 1 tab daily  - through  other providers: lexapro 5 mg - felt like in a fog on it - that's why she stopped taking it and felt fine off of it and started it in case it was why headaches were worse     Abortive:   Exedrin migraine - maybe 2-3 times a month or more (2 cups a day)  Ibuprofen - not much  Rizatriptan - helps often but sometimes not enough and has to repeat, if she wakes up with makes her more tired but can manage  Denies bothersome side effects      - sleep - 6-8 hours, sometimes with headaches, otherwise so exhausted out like light, tired in morning, sleep not refreshing, wakes up maybe 1-2 times to pee or dogs, side or back, starts out on back, just got a new pillow, snores, every one in family snores loudly, 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  0 - would never doze  1 - slight chance of dozing  2 - moderate chance of dozing  3 - high chance of dozing    [place] sleepiness scale  Sitting and reading - 2  Watching TV - 3  Sitting, inactive in a public place such as a theater 1  As a passenger in a car for an hour without a break 1  Lying down to rest in afternoon when circumstances permit 3  Sitting and talking to someone 0  Sitting quietly after lunch without alcohol 2  In a car while stopped for few minutes in traffic -0      Interval history as of 3/1/2022  - denies any new or concerning neurologic symptoms since last visit     Headaches and migraines   - continues to do well with about 4 migraines per month that are milder than before and last shorter periods  Preventative: magnesium  Abortive:  Ibuprofen, Rizatriptan works   - prochlorperazine does not work  Denies bothersome side effects     Interval history as of 02/22/2021:  - no new concerns  Migraines are improving, not as frequent and not as severe, 2-3 a month  Prevention: magnesium   Abortive: rizatriptan helps But makes her tired.    -  prochlorperazine does not work as well     Interval history as of 9/21/2020:  Migraines - as of 9/21/2020: maybe 2-3 a month, not taking supplements, rizatriptan helps   Prevention:  Headache preventative supplements  Abortive:  trial of rizatriptan - working well, used 8 in 3 months, makes her a little sleepy         Headaches started at what age? More so in mid 20s years old  How often do the headaches occur?   - as of 6/17/2020: 3-4 migraines a month  - as of 9/21/2020: maybe 2-3 a month, not taking supplements, rizatriptan helps   What time of the day do the headaches start?  Often wakes up with it   How long do the headaches last? 1-2 days, Up to 2 weeks   Are you ever headache free? Yes     Aura? without aura     Last eye exam:   1/2020 - normal except for glasses      Where is your headache located and pain quality?   - right frontal and occipital - shooting, pulsating, pressure  - rarely on the left side      What is the intensity of pain? Average: 4-5/10, worst 6-7/10  Associated symptoms:   [x] Nausea       [] Vomiting        [] Diarrhea   [x] Stiff or sore neck   [x] Problems with concentration  [x] Photophobia  >   [x]Phonophobia      [] Osmophobia  [x] Blurred vision - maybe slight  [x] Prefer quiet, dark room  [] Light-headed or dizzy     [] Tinnitus    [] Hands or feet tingle or feel numb/paresthesias       [] Red ear      [] Ptosis      [] Facial droop  [] Lacrimation - both eyes without migraine  [] Nasal congestion- pressure sometimes without migraine    [] Flushing of face  [] Change in pupil size        Things that make the headache worse? No specific movements, any movement      Headache triggers:   menses      Have you seen someone else for headaches or pain? Yes, PCP  Have you had trigger point injection performed and how often? No  Have you had Botox injection performed and how often? No   Have you had epidural injections or transforaminal injections performed? No  Are you current pregnant or  planning on getting pregnant? No plans, TBD, on birth control   Have you ever had any Brain imaging? no     What medications do you take or have you taken for your headaches?   ABORTIVE:    OTC medications have been ineffective      Excedrin - helps a little      Past:   toradol IM - helped      PREVENTIVE:   -     For mood:  Lexapro 10 mg - over a year and helping      Past:  Antihypertensive such as verapamil or propranolol be contraindicated due to hypotension at this time     Alternative therapies used in the past for headaches? no     LIFESTYLE  Sleep   - averages: 7-9 hours  Problems falling asleep?:   No  Problems staying asleep?:  No     Physical activity: 3 days a week      Water: 1 gallon per day  Caffeine: 2 cups per day     Mood: dysthmia  - counseling when younger      The following portions of the patient's history were reviewed and updated as appropriate: allergies, current medications, past family history, past medical history, past social history, past surgical history and problem list.     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  Education: BS  Lives with boyfriend and dog      Illicit Drugs: denies  Alcohol/tobacco: quit tobacco 2018, social alcohol     Past Medical History:     Past Medical History:   Diagnosis Date    Depression     Migraine        Patient Active Problem List   Diagnosis    Migraine headache    Dysthymia    Migraine without aura and without status migrainosus, not intractable    MELO (obstructive sleep apnea)       Medications:      Current Outpatient Medications   Medication Sig Dispense Refill    ascorbic acid (VITAMIN C) 250 mg tablet Take 250 mg by mouth daily      benzonatate (TESSALON PERLES) 100 mg capsule Take 1 capsule (100 mg total) by mouth 3 (three) times a day as needed for cough 15  capsule 0    cholecalciferol (VITAMIN D3) 400 units tablet Take 400 Units by mouth daily      Cyanocobalamin (VITAMIN B 12 PO) Take 1 tablet by mouth daily      LESSINA 0.1-20 MG-MCG per tablet       magnesium gluconate (MAGONATE) 500 mg tablet Take 500 mg by mouth daily      Multiple Vitamin (MULTIVITAMIN) tablet Take 1 tablet by mouth daily      SUMAtriptan (IMITREX) 100 mg tablet Take 1 tablet (100 mg total) by mouth once as needed for migraine May repeat x1 in 2 hours, max 2/24 hours, 9/month 9 tablet 11     No current facility-administered medications for this visit.        Allergies:      Allergies   Allergen Reactions    Sulfamethoxazole-Trimethoprim      Other reaction(s): Other (Please comment)  Reported by Pharmacy       Family History:     Family History   Problem Relation Age of Onset    Hypertension Mother     Asthma Mother     Fibromyalgia Mother     Colon cancer Father     No Known Problems Sister     No Known Problems Brother     COPD Maternal Grandmother     No Known Problems Maternal Grandfather     Hypothyroidism Paternal Grandmother     Diabetes Paternal Grandfather     Heart block Paternal Grandfather     No Known Problems Brother        Social History:     Social History     Socioeconomic History    Marital status: /Civil Union     Spouse name: Not on file    Number of children: Not on file    Years of education: Not on file    Highest education level: Not on file   Occupational History    Occupation:    Tobacco Use    Smoking status: Former     Passive exposure: Never    Smokeless tobacco: Never    Tobacco comments:     2018   Vaping Use    Vaping status: Never Used   Substance and Sexual Activity    Alcohol use: Yes     Comment: occassional    Drug use: Never    Sexual activity: Yes     Partners: Male   Other Topics Concern    Not on file   Social History Narrative    Wears eat belt     Social Determinants of Health     Financial Resource Strain: Not on file   Food  "Insecurity: Not on file   Transportation Needs: Not on file   Physical Activity: Not on file   Stress: Not on file   Social Connections: Not on file   Intimate Partner Violence: Not on file   Housing Stability: Not on file         Objective:         Physical Exam:                                                                 Vitals:            Constitutional:    /65 (BP Location: Right arm, Patient Position: Sitting, Cuff Size: Standard)   Pulse 63   Temp 98.4 °F (36.9 °C) (Temporal)   Ht 5' 4\" (1.626 m)   Wt 59.4 kg (131 lb)   BMI 22.49 kg/m²   BP Readings from Last 3 Encounters:   03/26/24 115/65   03/15/24 108/70   02/23/24 126/68     Pulse Readings from Last 3 Encounters:   03/26/24 63   03/15/24 75   02/23/24 69         Well developed, well nourished, well groomed.        Psychiatric:  Normal behavior and appropriate affect        Neurological Examination:     Mental status/cognitive function:   Recent and remote memory appear intact. Attention span and concentration as well as fund of knowledge are appropriate for age. Normal language and spontaneous speech.  Cranial Nerves:   VII-facial expression symmetric  Motor Exam: symmetric bulk throughout. no atrophy, fasciculations or abnormal movements noted.   Coordination:  no apparent dysmetria, ataxia or tremor noted  Gait: steady casual gait          Pertinent lab results:   See EMR for recent labs  -1/26/2024 CMP and CBC unremarkable, vitamin D 94, B12 1025, TSH normal   -7/20/2021   05/27/2020 CMP and CBC unremarkable, TSH normal     Imaging:   I have personally reviewed imaging and radiology read   -MRI brain with and without contrast 1/31/2024: Per radiology No acute infarction, intracranial hemorrhage or mass.  *as of retrospective review 3/26/24 she has a slight dip in sella, in my opinion slight prominence of bilateral optic nerve sheath, ventricles appear normal, cerebellar tonsils overlie the foramen magnum with tight junction in my " opinion     Past Medical History:     Past Medical History:   Diagnosis Date    Depression     Migraine        Patient Active Problem List   Diagnosis    Dysthymia    Migraine without aura and without status migrainosus, not intractable       Medications:      Current Outpatient Medications   Medication Sig Dispense Refill    ascorbic acid (VITAMIN C) 250 mg tablet Take 250 mg by mouth daily      cholecalciferol (VITAMIN D3) 400 units tablet Take 400 Units by mouth daily      Cyanocobalamin (VITAMIN B 12 PO) Take 1 tablet by mouth daily      LESSINA 0.1-20 MG-MCG per tablet       magnesium gluconate (MAGONATE) 500 mg tablet Take 500 mg by mouth daily      Multiple Vitamin (MULTIVITAMIN) tablet Take 1 tablet by mouth daily      SUMAtriptan (IMITREX) 100 mg tablet Take 1 tablet (100 mg total) by mouth once as needed for migraine May repeat x1 in 2 hours, max 2/24 hours, 9/month 9 tablet 11    benzonatate (TESSALON PERLES) 100 mg capsule Take 1 capsule (100 mg total) by mouth 3 (three) times a day as needed for cough (Patient not taking: Reported on 8/5/2024) 15 capsule 0     No current facility-administered medications for this visit.        Allergies:      No Known Allergies      Family History:     Family History   Problem Relation Age of Onset    Hypertension Mother     Asthma Mother     Fibromyalgia Mother     Colon cancer Father     No Known Problems Sister     No Known Problems Brother     COPD Maternal Grandmother     No Known Problems Maternal Grandfather     Hypothyroidism Paternal Grandmother     Diabetes Paternal Grandfather     Heart block Paternal Grandfather     No Known Problems Brother        Social History:     Social History     Socioeconomic History    Marital status: /Civil Union     Spouse name: Not on file    Number of children: Not on file    Years of education: Not on file    Highest education level: Not on file   Occupational History    Occupation:    Tobacco Use    Smoking  "status: Former     Passive exposure: Never    Smokeless tobacco: Never    Tobacco comments:     2018   Vaping Use    Vaping status: Never Used   Substance and Sexual Activity    Alcohol use: Yes     Comment: occassional    Drug use: Never    Sexual activity: Yes     Partners: Male   Other Topics Concern    Not on file   Social History Narrative    Wears eat belt     Social Determinants of Health     Financial Resource Strain: Not on file   Food Insecurity: Not on file   Transportation Needs: Not on file   Physical Activity: Not on file   Stress: Not on file   Social Connections: Unknown (6/18/2024)    Received from [facility]     How often do you feel lonely or isolated from those around you? (Adult - for ages 18 years and over): Not on file   Intimate Partner Violence: Not on file   Housing Stability: Not on file         Objective:         Physical Exam:                                                                 Vitals:            Constitutional:    /68 (BP Location: Left arm, Patient Position: Sitting, Cuff Size: Standard)   Pulse 73   Ht 5' 4\" (1.626 m)   Wt 59.4 kg (131 lb)   BMI 22.49 kg/m²   BP Readings from Last 3 Encounters:   08/05/24 118/68   03/26/24 115/65   03/15/24 108/70     Pulse Readings from Last 3 Encounters:   08/05/24 73   03/26/24 63   03/15/24 75         Well developed, well nourished, well groomed.        Psychiatric:  Normal behavior and appropriate affect        Able to answer questions appropriately, provide history of recent events   Normal language and spontaneous speech.  facial expression symmetric  symmetric bulk throughout. no atrophy, fasciculations or significant abnormal movements noted during our visit from observation.   steady casual gait      Review of Systems:   ROS obtained by medical assistant and reviewed, but if any symptoms listed below say negative, does not mean patient has not had this symptom since last visit, please see HPI for " details of symptoms discussed this visit.  Regarding any abnormal or positive findings in ROS that are not neurologic related, patient instructed to address these issues with PCP or go to the ER if they are severe.      Review of Systems   Constitutional:  Negative for appetite change, fatigue and fever.   HENT: Negative.  Negative for hearing loss, tinnitus, trouble swallowing and voice change.    Eyes: Negative.  Negative for photophobia, pain and visual disturbance.   Respiratory: Negative.  Negative for shortness of breath.    Cardiovascular: Negative.  Negative for palpitations.   Gastrointestinal: Negative.  Negative for nausea and vomiting.   Endocrine: Negative.  Negative for cold intolerance.   Genitourinary: Negative.  Negative for dysuria, frequency and urgency.   Musculoskeletal:  Negative for back pain, gait problem, myalgias, neck pain and neck stiffness.   Skin: Negative.  Negative for rash.   Allergic/Immunologic: Negative.    Neurological:  Positive for headaches (migraines  3 to 4 a month). Negative for dizziness, tremors, seizures, syncope, facial asymmetry, speech difficulty, weakness, light-headedness and numbness.   Hematological: Negative.  Does not bruise/bleed easily.   Psychiatric/Behavioral: Negative.  Negative for confusion, hallucinations and sleep disturbance.     I have spent 16 minutes with Patient  today in which greater than 50% of this time was spent in counseling/coordination of care regarding Prognosis, Risks and benefits of tx options, Instructions for management, Patient and family education, Importance of tx compliance, Risk factor reductions, Impressions, Counseling / Coordination of care, Documenting in the medical record, Obtaining or reviewing history  , and Communicating with other healthcare professionals . I also spent 14 minutes non face to face for this patient the same day.       Author:  Argenis Knapp MD 8/5/2024 11:05 AM

## 2024-08-27 NOTE — PROGRESS NOTES
Cfr syndrome  Please take medications as directed. May take tylenol 500 mg every 4-6 hours as needed for fever and pain  Supportive care and monitoring, and Follow up with your PCp or here if not better in 1 week or sooner if worse   Go to ER if it is needed   Name: Merry Venegas      : 1990      MRN: [id number]  Encounter Provider: Adrian Huffman DO  Encounter Date: 2024   Encounter department: [facility]    Assessment & Plan   Rx put in for Valtrex with hopes that it would reduce the pain and prevent further outbreak.  She will call if symptoms continue or increase.  I told her to get lidocaine gel if necessary for pain and also may put Vaseline on the rash for relief.  1. Herpes zoster without complication  -     valACYclovir (VALTREX) 1,000 mg tablet; Take 1 tablet (1,000 mg total) by mouth 3 (three) times a day for 7 days        Depression Screening and Follow-up Plan: Patient was screened for depression during today's encounter. They screened negative with a PHQ-9 score of 0.        Subjective     Patient here today stating that this past Friday and Saturday started with the tender bumpy rash underneath her right breast.  The pain increased over this week.  Water from the shower burns.  Patient states she had chickenpox as a child.  She denies any fever or chills.  She states the pain is a little bit less than it had been earlier this week.    Rash      Review of Systems   Constitutional: Negative.    Respiratory: Negative.     Cardiovascular: Negative.    Gastrointestinal: Negative.    Genitourinary: Negative.    Skin:  Positive for rash.       Past Medical History:   Diagnosis Date   • Depression    • Migraine      Past Surgical History:   Procedure Laterality Date   • WISDOM TOOTH EXTRACTION Bilateral          Family History   Problem Relation Age of Onset   • Hypertension Mother    • Asthma Mother    • Fibromyalgia Mother    • Colon cancer Father    • No Known Problems Sister    • No Known Problems Brother    • COPD Maternal Grandmother    • No Known Problems Maternal Grandfather    • Hypothyroidism Paternal Grandmother    • Diabetes Paternal Grandfather    • Heart block Paternal Grandfather    • No Known Problems Brother       Social History     Socioeconomic History   • Marital status: /Civil Union     Spouse name: None   • Number of children: None   • Years of education: None   • Highest education level: None   Occupational History   • Occupation:    Tobacco Use   • Smoking status: Former     Passive exposure: Never   • Smokeless tobacco: Never   • Tobacco comments:     2018   Vaping Use   • Vaping status: Never Used   Substance and Sexual Activity   • Alcohol use: Yes     Comment: occassional   • Drug use: Never   • Sexual activity: Yes     Partners: Male   Other Topics Concern   • None   Social History Narrative    Wears eat belt     Social Determinants of Health     Financial Resource Strain: Not on file   Food Insecurity: Not on file   Transportation Needs: Not on file   Physical Activity: Not on file   Stress: Not on file   Social Connections: Not on file   Intimate Partner Violence: Not on file   Housing Stability: Not on file     Current Outpatient Medications on File Prior to Visit   Medication Sig   • ascorbic acid (VITAMIN C) 250 mg tablet Take 250 mg by mouth daily   • cholecalciferol (VITAMIN D3) 400 units tablet Take 400 Units by mouth daily   • Cyanocobalamin (VITAMIN B 12 PO) Take 1 tablet by mouth daily as needed   • levonorgestrel-ethinyl estradiol (AVIANE,ALESSE,LESSINA) 0.1-20 MG-MCG per tablet Take 20 mcg by mouth daily   • magnesium gluconate (MAGONATE) 500 mg tablet Take 250 mg by mouth daily   • Multiple Vitamin (MULTIVITAMIN) tablet Take 1 tablet by mouth daily   • SUMAtriptan (IMITREX) 100 mg tablet Take 1 tablet (100 mg total) by mouth once as needed for migraine May repeat x1 in 2 hours, max 2/24 hours, 9/month   • escitalopram (LEXAPRO) 10 mg tablet Take 1 tablet (10 mg total) by mouth daily (Patient not taking: Reported on 2/23/2024)   • LESSINA 0.1-20 MG-MCG per tablet  (Patient not taking: Reported on 2/23/2024)     Allergies   Allergen Reactions   • Sulfamethoxazole-Trimethoprim   "    Other reaction(s): Other (Please comment)  Reported by Pharmacy     Immunization History   Administered Date(s) Administered   • COVID-19 MODERNA VACC 0.5 ML IM 04/06/2021, 05/06/2021   • Influenza Injectable, MDCK, Preservative Free, Quadrivalent, 0.5 mL 10/18/2019   • Influenza, injectable, quadrivalent, preservative free 0.5 mL 10/24/2018   • Tdap 09/09/2023       Objective     /68   Pulse 69   Temp 98.3 °F (36.8 °C)   Ht 5' 4\" (1.626 m)   Wt 60.6 kg (133 lb 9.6 oz)   SpO2 99%   BMI 22.93 kg/m²     Physical Exam  Vitals reviewed. Exam conducted with a chaperone present (Jayne).   Constitutional:       General: She is not in acute distress.     Appearance: Normal appearance. She is well-developed. She is not diaphoretic.   HENT:      Head: Normocephalic and atraumatic.   Eyes:      Conjunctiva/sclera: Conjunctivae normal.   Pulmonary:      Comments: Talking in full sentences in no distress  Skin:     Findings: Rash (Vesicular rash on right chest, just underneath the breast, starting to crust) present.   Neurological:      General: No focal deficit present.      Mental Status: She is alert and oriented to person, place, and time.   Psychiatric:         Mood and Affect: Mood normal.         Behavior: Behavior normal.         Thought Content: Thought content normal.         Judgment: Judgment normal.       Adrian Huffman, DO    "

## 2025-02-05 ENCOUNTER — OFFICE VISIT (OUTPATIENT)
Dept: NEUROLOGY | Facility: CLINIC | Age: 35
End: 2025-02-05
Payer: COMMERCIAL

## 2025-02-05 VITALS
HEART RATE: 86 BPM | SYSTOLIC BLOOD PRESSURE: 117 MMHG | HEIGHT: 64 IN | TEMPERATURE: 98.2 F | DIASTOLIC BLOOD PRESSURE: 71 MMHG | WEIGHT: 141 LBS | BODY MASS INDEX: 24.07 KG/M2

## 2025-02-05 DIAGNOSIS — Z3A.10 10 WEEKS GESTATION OF PREGNANCY: ICD-10-CM

## 2025-02-05 DIAGNOSIS — G43.009 MIGRAINE WITHOUT AURA AND WITHOUT STATUS MIGRAINOSUS, NOT INTRACTABLE: Primary | ICD-10-CM

## 2025-02-05 PROCEDURE — 99214 OFFICE O/P EST MOD 30 MIN: CPT | Performed by: PSYCHIATRY & NEUROLOGY

## 2025-02-05 NOTE — PROGRESS NOTES
Review of Systems   Constitutional:  Negative for appetite change and fever.   HENT: Negative.  Negative for hearing loss, tinnitus, trouble swallowing and voice change.    Eyes: Negative.  Negative for photophobia and pain.   Respiratory: Negative.  Negative for shortness of breath.    Cardiovascular: Negative.  Negative for palpitations.   Gastrointestinal:  Positive for nausea. Negative for vomiting.   Endocrine: Negative.  Negative for cold intolerance.   Genitourinary: Negative.  Negative for dysuria, frequency and urgency.   Musculoskeletal: Negative.  Negative for myalgias and neck pain.   Skin: Negative.  Negative for rash.   Neurological:  Positive for headaches (1 a month since getting pregnant). Negative for dizziness, tremors, seizures, syncope, facial asymmetry, speech difficulty, weakness, light-headedness and numbness.   Hematological: Negative.  Does not bruise/bleed easily.   Psychiatric/Behavioral:  Positive for sleep disturbance. Negative for confusion and hallucinations.    All other systems reviewed and are negative.

## 2025-02-05 NOTE — ASSESSMENT & PLAN NOTE
Assessment/Plan:   Merry Germain is a delightful 35 y.o. female with a past medical history that includes dysthymia/depression, migraines, vitamin-D deficiency  referred here for evaluation of headache.  My initial evaluation 6/17/2020     Migraine without aura and without status migrainosus, not intractable  Apneas without meeting criteria for MELO (home study, 1/26/2024, 7 total with 4 obstructive apneas, 3 hypopneas, AURELIA 0.8, oxygen down to 88%) she is not currently interested in in lab study to further evaluate  She reports a personal history of migraines in family history of migraines.  She reports pain is typically right frontal and occipital shooting pulsating pressure and rarely on the left side.  She denies aura and reports typical associated migrainous features without associated autonomic features.  - as of 6/16/2020: 3-4 migraines a month  - as of 9/21/2020: migraines maybe 2-3 a month, not taking supplement but still seem better, rizatriptan helps But makes her tired.  Will see if prochlorperazine helps but does not make her tired.  Trial of magnesium for prevention  - as of 2/22/2021: She continues to do well with migraines to 3 times per month.  Taking magnesium for prevention and rizatriptan helps for abortive.  - as of 3/1/2022: continues to do well with about 4 migraines per month on magnesium for prevention that are milder, shorter and respond to ibuprofen, exedrin or rizatriptan.   - as of 1/17/2024: She reports increasing frequency and severity of migraines as well as new features and we discussed MRI brain to further evaluate.  She reports over the past year migraines have been worse and on average once a week but she may wake up with a dull headache on most days.  Migraines cannot be associated with positional lightheadedness at times.  Recent eye exam June 2023 no papilledema.  Fatigue despite full nights of sleep, symptoms suggestive of possible sleep disorder and sleep study ordered to  further evaluate for possible sleep disorder contributing to symptoms.  She also recently got  and is considering pregnancy in summer 2024 and we discussed the unknowns of medications in pregnancy as well as current guidelines as to what is thought to be the safest although still unknowns.  - as of 3/26/2024: She reports she took the bull by the horns and after last visit started a journal to look for associations with dietary intake, weather, sleep etc. and migraines and made significant lifestyle changes for the better which have significantly improved her headaches.  She reports migraines about twice a month and sumatriptan 100 mg works well which she only needed a few times.  Did not make her more sleepy than rizatriptan and not currently interested in Nurtec or Ubrelvy trial which may not cause such side effects as Excedrin also helps the milder ones.  She was also able to come off Lexapro and mood remains well-controlled despite stressors at home.  Continued headache preventative supplements and she did increase magnesium from 250 mg to 500 mg.  We reviewed MRI brain imaging together in detail as well as my over read which shows subtle nonspecific features.  We reviewed home sleep study which showed 4 apneas and she is currently not interested in in lab sleep study to further evaluate sleep.  Labs also looked excellent  - as of 8/5/2024: She reports she continues to have improvement with the lifestyle changes and since last visit they moved from her parents house to their own house and this has also helped decrease stress and improved headaches and migraines.  She reports 3-4 a month on average and typically sumatriptan/Imitrex works.  Excedrin helps the milder ones but she is not taking this if she knows she is has sumatriptan which is great.  We discussed the unknowns of medications during pregnancy which she is considering in the next year, but she is considering coming off birth control sooner as  she went without it for a month on accident and felt much better generally and without migraines.  Continue magnesium 500 mg and if wanted could consider vitamin B2/riboflavin 400 mg.  She is aware we have multiple other medications if ever wanted/needed and is happy she is doing better than previously and not needing these currently.  - as of 2/5/2025: She reports she is doing dramatically better after stopping birth control in October she went from headaches multiple times a week to maybe 2 a month and soon after she got pregnant and now is only getting about 1 headache a month that lasts a few hours and goes away naturally.  She does not even need to take acetaminophen let alone sumatriptan since pregnancy.  She is establishing care with OB on Friday and estimates she is about 10 weeks and we discussed again the unknowns of medications during pregnancy, those that are thought to be safer than others and what to avoid  If she ends up needing anything.  Otherwise continue magnesium for prevention, she did not like riboflavin/B2 as it made her urine neon green/yellow and therefore she could not tell if she was hydrated.  Morning sickness has not been bad enough to need something like ondansetron/Zofran thankfully.    Workup:  -MRI brain with and without contrast 1/31/2024: Per radiology No acute infarction, intracranial hemorrhage or mass.  *as of retrospective review 3/26/24 she has a slight dip in sella, in my opinion slight prominence of bilateral optic nerve sheath, ventricles appear normal, cerebellar tonsils overlie the foramen magnum with tight junction without Chiari malformation  -1/26/2024 CMP and CBC unremarkable, vitamin D 94, B12 1025, TSH normal (we decreased vitamin B12 from 2500 mcg to 2000 mcg after this)     Preventative:  - we discussed headache hygiene and lifestyle factors that may improve headaches  - We discussed we have multiple prescription preventative options, but they are somewhat limited  during pregnancy and prior to pregnancy.   In the past Recommended increasing magnesium from 250 mg to 500 mg, adding vitamin B2 -not started but could  - On through the providers or OTC:  Magnesium 500 mg, vit D 5000 units, Vit C 500 mg, Vit B12 2000 mcg daily  - past/failed:  Lexapro, antihypertensive such as propranolol or verapamil would be contraindicated currently due to hypotension, amitriptyline or , Lexapro 5 mg stopped early 2024  - future options when not trying to get pregnant or chance of pregnancy:  topiramate, acetazolamide/Diamox, CGRP med, botox   -Future options during pregnancy if needed: Cyproheptadine, cyclobenzaprine, propranolol, verapamil, amitriptyline, acetazolamide, she is not currently interested in Botox as we discussed if she were would need lead time since it takes time to kick in and she would have to try to prescription preventatives prior     Abortive:  - discussed not taking over-the-counter or prescription pain medications more than 3 days per week to prevent medication overuse/rebound headache  -   SUMAtriptan (IMITREX) 100 mg tablet; Take 1 tablet (100 mg total) by mouth once as needed for migraine May repeat x1 in 2 hours, max 2/24 hours, 9/month. Discussed proper use, possible side effects and risks.  - past/helped:  Toradol IM in the past helped  - past/failed:  OTC meds, prochlorperazine helped a little only, rizatriptan helps less than it used to and sometimes needs two and can make her tired, sumatriptan   - future options:  metoclopramide, Toradol IM or p.o., could consider trial for 5 days of Depakote or dexamethasone 4 prolonged migraine, ubrelvy, reyvow, nurtec next if needed discussed*    Patient instructions     Congratulations and keep up the strong work with all the positive lifestyle changes you made between visits!    We discussed if you would ever want an in lab sleep study just let me know as sometimes a home study can underestimate the problem and suggest  false negative results.     Consider vitamin B12  2000 mcg sublingual/under your tongue daily      -----------  Rescue medications thought to be likely ok during pregnancy:   First line: tylenol/acetaminophen, diphenhydramine/benadryl, Metoclopramide/Reglan, Second line:  rizatriptan/sumatriptan and other triptans, Ondansetron/zofran, prednisone short acting, prochlorperazine/compazine, promethazine/Phenergan, [also fioricet (butalbital-acetaminophen-caffeine) although I do not recommend this typically] -> instead could just take Excedrin tension which is similar to Excedrin Migraine without the aspirin  Avoid when possible: aspirin (except in those instructed to take by OB for other reasons), indomethacin, opioids   Always avoid: DHE/ergots, currently the new migraine meds have too many unknowns so we are avoiding for now until more information - lasmiditan/reyvow, Nurtec/rimegepant, Ubrelvy/Ubrogepant     These medications are thought to be compatible with breast-feeding:  Ibuprofen, acetaminophen, triptans  Probably compatible with breast-feeding:  Diphenhydramine/Benadryl, metoclopramide/Reglan, ondansetron /Zofran, if needed steroids, nurtec  prochlorperazine/Compazine  Avoid: Aspirin, DHE/ergot's, lasmiditan/Reyvow      ---------------------------------        Headache/migraine treatment:   Abortive medications (for immediate treatment of a headache):   It is ok to take ibuprofen, acetaminophen or naproxen (Advil, Tylenol,  Aleve, Excedrin) if they help your headaches you should limit these to No more than 3 times a week to avoid medication overuse/rebound headaches.     During pregnancy -if needing only take Excedrin tension instead of Excedrin Migraine without the aspirin     For your more moderate to severe migraines take this medication early   -     SUMAtriptan (IMITREX) 100 mg tablet; Take 1 tablet (100 mg total) by mouth once as needed for migraine May repeat x1 in 2 hours, max 2/24 hours,  9/month    Not until after pregnancy if needed:  Trial of Nurtec next or we discussed if your insurance prefers Ubrelvy would switch to that  Both of these would require a paperwork on the prior authorization but typically takes 1 to 2 weeks for your insurance to approve.  Then once it is approved there is a coupon card at each of their websites that completely covers the co-pay      Over the counter preventive supplements for headaches/migraines - try for 3 months   (to take every day to help prevent headaches - not to take at the time of headache):  [x] Magnesium 400mg -500 mg daily (If any diarrhea or upset stomach, decrease dose  as tolerated)  [] - I understand not using due to then hard to tell hydration status        Prescription preventive medications for headaches/migraines   (to take every day to help prevent headaches - not to take at the time of headache):  [x] we have lots of options if needed/wanted       *Typically these types of medications take time untill you see the benefit, although some may see improvement in days, often it may take weeks, especially if the medication is being titrated up to a beneficial level. Please contact us if there are any concerns or questions regarding the medication.         Lifestyle Recommendations:  [x] SLEEP - Maintain a regular sleep schedule: Adults need at least 7-8 hours of uninterrupted a night. Maintain good sleep hygiene:  Going to bed and waking up at consistent times, avoiding excessive daytime naps, avoiding caffeinated beverages in the evening, avoid excessive stimulation in the evening and generally using bed primarily for sleeping.  One hour before bedtime would recommend turning lights down lower, decreasing your activity (may read quietly, listen to music at a low volume). When you get into bed, should eliminate all technology (no texting, emailing, playing with your phone, iPad or tablet in bed).  [x] HYDRATION - Maintain good hydration.  Drink  2L  of fluid a day (4 typical small water bottles)  [x] DIET - Maintain good nutrition. In particular don't skip meals and try and eat healthy balanced meals regularly.  [x] TRIGGERS - Look for other triggers and avoid them: Limit caffeine to 1-2 cups a day or less. Avoid dietary triggers that you have noticed bring on your headaches (this could include aged cheese, peanuts, MSG, aspartame and nitrates).  [x] EXERCISE - physical exercise as we all know is good for you in many ways, and not only is good for your heart, but also is beneficial for your mental health, cognitive health and  chronic pain/headaches. I would encourage at the least 5 days of physical exercise weekly for at least 30 minutes.      Education and Follow-up  [x] Please call with any questions or concerns. Of course if any new concerning symptoms go to the emergency department.  [x] Follow up Oct or Nov 2025 in about 4 weeks or so after birth, sooner if needed

## 2025-02-05 NOTE — PATIENT INSTRUCTIONS
Congratulations and keep up the strong work with all the positive lifestyle changes you made between visits!    We discussed if you would ever want an in lab sleep study just let me know as sometimes a home study can underestimate the problem and suggest false negative results.     Consider vitamin B12  2000 mcg sublingual/under your tongue daily      -----------  Rescue medications thought to be likely ok during pregnancy:   First line: tylenol/acetaminophen, diphenhydramine/benadryl, Metoclopramide/Reglan, Second line:  rizatriptan/sumatriptan and other triptans, Ondansetron/zofran, prednisone short acting, prochlorperazine/compazine, promethazine/Phenergan, [also fioricet (butalbital-acetaminophen-caffeine) although I do not recommend this typically] -> instead could just take Excedrin tension which is similar to Excedrin Migraine without the aspirin  Avoid when possible: aspirin (except in those instructed to take by OB for other reasons), indomethacin, opioids   Always avoid: DHE/ergots, currently the new migraine meds have too many unknowns so we are avoiding for now until more information - lasmiditan/reyvow, Nurtec/rimegepant, Ubrelvy/Ubrogepant     These medications are thought to be compatible with breast-feeding:  Ibuprofen, acetaminophen, triptans  Probably compatible with breast-feeding:  Diphenhydramine/Benadryl, metoclopramide/Reglan, ondansetron /Zofran, if needed steroids, nurtec  prochlorperazine/Compazine  Avoid: Aspirin, DHE/ergot's, lasmiditan/Reyvow      ---------------------------------        Headache/migraine treatment:   Abortive medications (for immediate treatment of a headache):   It is ok to take ibuprofen, acetaminophen or naproxen (Advil, Tylenol,  Aleve, Excedrin) if they help your headaches you should limit these to No more than 3 times a week to avoid medication overuse/rebound headaches.     During pregnancy -if needing only take Excedrin tension instead of Excedrin Migraine  without the aspirin     For your more moderate to severe migraines take this medication early   -     SUMAtriptan (IMITREX) 100 mg tablet; Take 1 tablet (100 mg total) by mouth once as needed for migraine May repeat x1 in 2 hours, max 2/24 hours, 9/month    Not until after preg/breast feeding if needed:  Trial of Nurtec next or we discussed if your insurance prefers Ubrelvy would switch to that  Both of these would require a paperwork on the prior authorization but typically takes 1 to 2 weeks for your insurance to approve.  Then once it is approved there is a coupon card at each of their websites that completely covers the co-pay  *Although we are getting the ball rolling with getting Nurtec approved once you start to consider getting pregnant or at least once pregnant I would stop Nurtec just out of an abundance of precaution since we do not know what dose during pregnancy       Over the counter preventive supplements for headaches/migraines - try for 3 months   (to take every day to help prevent headaches - not to take at the time of headache):  [x] Magnesium 400mg -500 mg daily (If any diarrhea or upset stomach, decrease dose  as tolerated)  [] - I understand not using due to then hard to tell hydration status        Prescription preventive medications for headaches/migraines   (to take every day to help prevent headaches - not to take at the time of headache):  [x] we have lots of options if needed/wanted       *Typically these types of medications take time untill you see the benefit, although some may see improvement in days, often it may take weeks, especially if the medication is being titrated up to a beneficial level. Please contact us if there are any concerns or questions regarding the medication.         Lifestyle Recommendations:  [x] SLEEP - Maintain a regular sleep schedule: Adults need at least 7-8 hours of uninterrupted a night. Maintain good sleep hygiene:  Going to bed and waking up at  consistent times, avoiding excessive daytime naps, avoiding caffeinated beverages in the evening, avoid excessive stimulation in the evening and generally using bed primarily for sleeping.  One hour before bedtime would recommend turning lights down lower, decreasing your activity (may read quietly, listen to music at a low volume). When you get into bed, should eliminate all technology (no texting, emailing, playing with your phone, iPad or tablet in bed).  [x] HYDRATION - Maintain good hydration.  Drink  2L of fluid a day (4 typical small water bottles)  [x] DIET - Maintain good nutrition. In particular don't skip meals and try and eat healthy balanced meals regularly.  [x] TRIGGERS - Look for other triggers and avoid them: Limit caffeine to 1-2 cups a day or less. Avoid dietary triggers that you have noticed bring on your headaches (this could include aged cheese, peanuts, MSG, aspartame and nitrates).  [x] EXERCISE - physical exercise as we all know is good for you in many ways, and not only is good for your heart, but also is beneficial for your mental health, cognitive health and  chronic pain/headaches. I would encourage at the least 5 days of physical exercise weekly for at least 30 minutes.      Education and Follow-up  [x] Please call with any questions or concerns. Of course if any new concerning symptoms go to the emergency department.  [x] Follow up Oct or Nov 2025 in about 4 weeks or so after birth, sooner if needed

## 2025-02-05 NOTE — PROGRESS NOTES
Neurology Ambulatory Visit  Name: Merry Venegas       : 1990       MRN: [id number]   Encounter Provider: Argenis Knapp MD   Encounter Date: 2025  Encounter department: [facility]      :  Assessment & Plan  Migraine without aura and without status migrainosus, not intractable      Assessment/Plan:   Merry Germain is a delightful 35 y.o. female with a past medical history that includes dysthymia/depression, migraines, vitamin-D deficiency  referred here for evaluation of headache.  My initial evaluation 2020     Migraine without aura and without status migrainosus, not intractable  Apneas without meeting criteria for MELO (home study, 2024, 7 total with 4 obstructive apneas, 3 hypopneas, AURELIA 0.8, oxygen down to 88%) she is not currently interested in in lab study to further evaluate  She reports a personal history of migraines in family history of migraines.  She reports pain is typically right frontal and occipital shooting pulsating pressure and rarely on the left side.  She denies aura and reports typical associated migrainous features without associated autonomic features.  - as of 2020: 3-4 migraines a month  - as of 2020: migraines maybe 2-3 a month, not taking supplement but still seem better, rizatriptan helps But makes her tired.  Will see if prochlorperazine helps but does not make her tired.  Trial of magnesium for prevention  - as of 2021: She continues to do well with migraines to 3 times per month.  Taking magnesium for prevention and rizatriptan helps for abortive.  - as of 3/1/2022: continues to do well with about 4 migraines per month on magnesium for prevention that are milder, shorter and respond to ibuprofen, exedrin or rizatriptan.   - as of 2024: She reports increasing frequency and severity of migraines as well as new features and we discussed MRI brain to further evaluate.  She reports over the past year migraines  have been worse and on average once a week but she may wake up with a dull headache on most days.  Migraines cannot be associated with positional lightheadedness at times.  Recent eye exam June 2023 no papilledema.  Fatigue despite full nights of sleep, symptoms suggestive of possible sleep disorder and sleep study ordered to further evaluate for possible sleep disorder contributing to symptoms.  She also recently got  and is considering pregnancy in summer 2024 and we discussed the unknowns of medications in pregnancy as well as current guidelines as to what is thought to be the safest although still unknowns.  - as of 3/26/2024: She reports she took the bull by the [employer] and after last visit started a journal to look for associations with dietary intake, weather, sleep etc. and migraines and made significant lifestyle changes for the better which have significantly improved her headaches.  She reports migraines about twice a month and sumatriptan 100 mg works well which she only needed a few times.  Did not make her more sleepy than rizatriptan and not currently interested in Nurtec or Ubrelvy trial which may not cause such side effects as Excedrin also helps the milder ones.  She was also able to come off Lexapro and mood remains well-controlled despite stressors at home.  Continued headache preventative supplements and she did increase magnesium from 250 mg to 500 mg.  We reviewed MRI brain imaging together in detail as well as my over read which shows subtle nonspecific features.  We reviewed home sleep study which showed 4 apneas and she is currently not interested in in lab sleep study to further evaluate sleep.  Labs also looked excellent  - as of 8/5/2024: She reports she continues to have improvement with the lifestyle changes and since last visit they moved from her parents house to their own house and this has also helped decrease stress and improved headaches and migraines.  She reports 3-4 a  month on average and typically sumatriptan/Imitrex works.  Excedrin helps the milder ones but she is not taking this if she knows she is has sumatriptan which is great.  We discussed the unknowns of medications during pregnancy which she is considering in the next year, but she is considering coming off birth control sooner as she went without it for a month on accident and felt much better generally and without migraines.  Continue magnesium 500 mg and if wanted could consider vitamin B2/riboflavin 400 mg.  She is aware we have multiple other medications if ever wanted/needed and is happy she is doing better than previously and not needing these currently.  - as of 2/5/2025: She reports she is doing dramatically better after stopping birth control in October she went from headaches multiple times a week to maybe 2 a month and soon after she got pregnant and now is only getting about 1 headache a month that lasts a few hours and goes away naturally.  She does not even need to take acetaminophen let alone sumatriptan since pregnancy.  She is establishing care with OB on Friday and estimates she is about 10 weeks and we discussed again the unknowns of medications during pregnancy, those that are thought to be safer than others and what to avoid  If she ends up needing anything.  Otherwise continue magnesium for prevention, she did not like riboflavin/B2 as it made her urine neon green/yellow and therefore she could not tell if she was hydrated.  Morning sickness has not been bad enough to need something like ondansetron/Zofran thankfully.    Workup:  -MRI brain with and without contrast 1/31/2024: Per radiology No acute infarction, intracranial hemorrhage or mass.  *as of retrospective review 3/26/24 she has a slight dip in sella, in my opinion slight prominence of bilateral optic nerve sheath, ventricles appear normal, cerebellar tonsils overlie the foramen magnum with tight junction without Chiari  malformation  -1/26/2024 CMP and CBC unremarkable, vitamin D 94, B12 1025, TSH normal (we decreased vitamin B12 from 2500 mcg to 2000 mcg after this)     Preventative:  - we discussed headache hygiene and lifestyle factors that may improve headaches  - We discussed we have multiple prescription preventative options, but they are somewhat limited during pregnancy and prior to pregnancy.   In the past Recommended increasing magnesium from 250 mg to 500 mg, adding vitamin B2 -not started but could  - On through the providers or OTC:  Magnesium 500 mg, vit D 5000 units, Vit C 500 mg, Vit B12 2000 mcg daily  - past/failed:  Lexapro, antihypertensive such as propranolol or verapamil would be contraindicated currently due to hypotension, amitriptyline or , Lexapro 5 mg stopped early 2024  - future options when not trying to get pregnant or chance of pregnancy:  topiramate, acetazolamide/Diamox, CGRP med, botox   -Future options during pregnancy if needed: Cyproheptadine, cyclobenzaprine, propranolol, verapamil, amitriptyline, acetazolamide, she is not currently interested in Botox as we discussed if she were would need lead time since it takes time to kick in and she would have to try to prescription preventatives prior     Abortive:  - discussed not taking over-the-counter or prescription pain medications more than 3 days per week to prevent medication overuse/rebound headache  -   SUMAtriptan (IMITREX) 100 mg tablet; Take 1 tablet (100 mg total) by mouth once as needed for migraine May repeat x1 in 2 hours, max 2/24 hours, 9/month. Discussed proper use, possible side effects and risks.  - past/helped:  Toradol IM in the past helped  - past/failed:  OTC meds, prochlorperazine helped a little only, rizatriptan helps less than it used to and sometimes needs two and can make her tired, sumatriptan   - future options:  metoclopramide, Toradol IM or p.o., could consider trial for 5 days of Depakote or dexamethasone 4 prolonged  migraine, ubrelvy, reyvow, nurtec next if needed discussed*    Patient instructions     Congratulations and keep up the strong work with all the positive lifestyle changes you made between visits!    We discussed if you would ever want an in lab sleep study just let me know as sometimes a home study can underestimate the problem and suggest false negative results.     Consider vitamin B12  2000 mcg sublingual/under your tongue daily      -----------  Rescue medications thought to be likely ok during pregnancy:   First line: tylenol/acetaminophen, diphenhydramine/benadryl, Metoclopramide/Reglan, Second line:  rizatriptan/sumatriptan and other triptans, Ondansetron/zofran, prednisone short acting, prochlorperazine/compazine, promethazine/Phenergan, [also fioricet (butalbital-acetaminophen-caffeine) although I do not recommend this typically] -> instead could just take Excedrin tension which is similar to Excedrin Migraine without the aspirin  Avoid when possible: aspirin (except in those instructed to take by OB for other reasons), indomethacin, opioids   Always avoid: DHE/ergots, currently the new migraine meds have too many unknowns so we are avoiding for now until more information - lasmiditan/reyvow, Nurtec/rimegepant, Ubrelvy/Ubrogepant     These medications are thought to be compatible with breast-feeding:  Ibuprofen, acetaminophen, triptans  Probably compatible with breast-feeding:  Diphenhydramine/Benadryl, metoclopramide/Reglan, ondansetron /Zofran, if needed steroids, nurtec  prochlorperazine/Compazine  Avoid: Aspirin, DHE/ergot's, lasmiditan/Reyvow      ---------------------------------        Headache/migraine treatment:   Abortive medications (for immediate treatment of a headache):   It is ok to take ibuprofen, acetaminophen or naproxen (Advil, Tylenol,  Aleve, Excedrin) if they help your headaches you should limit these to No more than 3 times a week to avoid medication overuse/rebound headaches.      During pregnancy -if needing only take Excedrin tension instead of Excedrin Migraine without the aspirin     For your more moderate to severe migraines take this medication early   -     SUMAtriptan (IMITREX) 100 mg tablet; Take 1 tablet (100 mg total) by mouth once as needed for migraine May repeat x1 in 2 hours, max 2/24 hours, 9/month    Not until after pregnancy if needed:  Trial of Nurtec next or we discussed if your insurance prefers Ubrelvy would switch to that  Both of these would require a paperwork on the prior authorization but typically takes 1 to 2 weeks for your insurance to approve.  Then once it is approved there is a coupon card at each of their websites that completely covers the co-pay      Over the counter preventive supplements for headaches/migraines - try for 3 months   (to take every day to help prevent headaches - not to take at the time of headache):  [x] Magnesium 400mg -500 mg daily (If any diarrhea or upset stomach, decrease dose  as tolerated)  [] - I understand not using due to then hard to tell hydration status        Prescription preventive medications for headaches/migraines   (to take every day to help prevent headaches - not to take at the time of headache):  [x] we have lots of options if needed/wanted       *Typically these types of medications take time untill you see the benefit, although some may see improvement in days, often it may take weeks, especially if the medication is being titrated up to a beneficial level. Please contact us if there are any concerns or questions regarding the medication.         Lifestyle Recommendations:  [x] SLEEP - Maintain a regular sleep schedule: Adults need at least 7-8 hours of uninterrupted a night. Maintain good sleep hygiene:  Going to bed and waking up at consistent times, avoiding excessive daytime naps, avoiding caffeinated beverages in the evening, avoid excessive stimulation in the evening and generally using bed primarily for  sleeping.  One hour before bedtime would recommend turning lights down lower, decreasing your activity (may read quietly, listen to music at a low volume). When you get into bed, should eliminate all technology (no texting, emailing, playing with your phone, iPad or tablet in bed).  [x] HYDRATION - Maintain good hydration.  Drink  2L of fluid a day (4 typical small water bottles)  [x] DIET - Maintain good nutrition. In particular don't skip meals and try and eat healthy balanced meals regularly.  [x] TRIGGERS - Look for other triggers and avoid them: Limit caffeine to 1-2 cups a day or less. Avoid dietary triggers that you have noticed bring on your headaches (this could include aged cheese, peanuts, MSG, aspartame and nitrates).  [x] EXERCISE - physical exercise as we all know is good for you in many ways, and not only is good for your heart, but also is beneficial for your mental health, cognitive health and  chronic pain/headaches. I would encourage at the least 5 days of physical exercise weekly for at least 30 minutes.      Education and Follow-up  [x] Please call with any questions or concerns. Of course if any new concerning symptoms go to the emergency department.  [x] Follow up Oct or Nov 2025 in about 4 weeks or so after birth, sooner if needed         10 weeks gestation of pregnancy           CC:   We had the pleasure of evaluating Merry Venegas in neurological consultation today. Merry Venegas presents today for evaluation of headaches.   History obtained from patient as well as available medical record review.  History of Present Illness:   Interval history as of 2/5/2025  - Of note/managed by others:   -Since last visit she reported some thinning of her hair in late October and we discussed certainly anything is possible as far side effect from medication, but my experience have not found sumatriptan/Imitrex to frequently contribute to this and recommend considering dermatology for further  "evaluation  - maybe 10 weeks pregnant - seeing them soon, smells awesful and food aversions, tired, fatigue - 9/2/25  -When asked if she plans on breast-feeding-\"I'm not sure yet\"  - no significant new or concerning neurologic symptoms since last visit reported  - Patient concerns or questions: none  - sleep: - Apneas without meeting criteria for MELO (home study, 1/26/2024, 7 total with 4 obstructive apneas, 3 hypopneas, AURELIA 0.8, oxygen down to 88%) she is not currently interested in in lab study to further evaluate    Headaches and migraines   She reports she got pregnant and is doing great  She was getting headaches multiple times a week then in October 2024 she went off birth control and started getting headaches maybe 2 times a month (although could have gotten pregnant immediately helping this) and now that she is pregnant she is only getting headaches once a month, the last a few hours and go away naturally    Preventative:   - continue magnesium 500 mg and if wanted could consider vitamin B2/riboflavin 400 mg.   - On through the providers or OTC:  Magnesium 500 mg, vit D 5000 units, Vit C 500 mg, Vit B12 2000 mcg daily     Abortive:   -Has not needed anything since becoming pregnant the past 2 months or so  Would likely take acetaminophen first if needed, has sumatriptan at home if needed     Please see previous notes/EMR for details from previous visits.     Objective       Physical Exam:                                                                 Vitals:            Constitutional:    /71 (BP Location: Right arm, Patient Position: Sitting, Cuff Size: Standard)   Pulse 86   Temp 98.2 °F (36.8 °C) (Temporal)   Ht 5' 4\" (1.626 m)   Wt 64 kg (141 lb)   LMP 12/03/2024 (Exact Date)   BMI 24.20 kg/m²   BP Readings from Last 3 Encounters:   02/05/25 117/71   08/05/24 118/68   03/26/24 115/65     Pulse Readings from Last 3 Encounters:   02/05/25 86   08/05/24 73   03/26/24 63         Well developed, " well nourished, well groomed.        Psychiatric:  Normal behavior and appropriate affect        Able to answer questions appropriately, provide history of recent events   Normal language and spontaneous speech.  facial expression symmetric  symmetric bulk throughout. no atrophy, fasciculations or significant abnormal movements noted during our visit from observation.   steady casual gait       ROS:  ROS obtained by medical assistant and reviewed, but if any symptoms listed below say negative, does not mean patient has not had this symptom since last visit, please see HPI for details of symptoms discussed this visit.  Regarding any abnormal or positive findings in ROS that are not neurologic related, patient instructed to address these issues with PCP or go to the ER if they are severe.    Review of Systems   Constitutional:  Negative for appetite change and fever.   HENT: Negative.  Negative for hearing loss, tinnitus, trouble swallowing and voice change.    Eyes: Negative.  Negative for photophobia and pain.   Respiratory: Negative.  Negative for shortness of breath.    Cardiovascular: Negative.  Negative for palpitations.   Gastrointestinal:  Positive for nausea. Negative for vomiting.   Endocrine: Negative.  Negative for cold intolerance.   Genitourinary: Negative.  Negative for dysuria, frequency and urgency.   Musculoskeletal: Negative.  Negative for myalgias and neck pain.   Skin: Negative.  Negative for rash.   Neurological:  Positive for headaches (1 a month since getting pregnant). Negative for dizziness, tremors, seizures, syncope, facial asymmetry, speech difficulty, weakness, light-headedness and numbness.   Hematological: Negative.  Does not bruise/bleed easily.   Psychiatric/Behavioral:  Positive for sleep disturbance. Negative for confusion and hallucinations.    All other systems reviewed and are negative.       Administrative Statements  I have spent 14 minutes prior to or after the visit and 18  minutes during the visit, for a total time of 32 minutes in caring for this patient on the day of the visit/encounter including Prognosis, Risks and benefits of tx options, Instructions for management, Patient education, Importance of tx compliance, Risk factor reductions, Impressions, Counseling / Coordination of care, Documenting in the medical record, Obtaining or reviewing history  , and Communicating with other healthcare professionals .

## 2025-02-07 ENCOUNTER — ULTRASOUND (OUTPATIENT)
Dept: OBGYN CLINIC | Facility: CLINIC | Age: 35
End: 2025-02-07
Payer: COMMERCIAL

## 2025-02-07 VITALS
SYSTOLIC BLOOD PRESSURE: 116 MMHG | HEIGHT: 64 IN | WEIGHT: 135 LBS | BODY MASS INDEX: 23.05 KG/M2 | DIASTOLIC BLOOD PRESSURE: 70 MMHG

## 2025-02-07 DIAGNOSIS — N91.2 AMENORRHEA: Primary | ICD-10-CM

## 2025-02-07 DIAGNOSIS — Z34.91 FIRST TRIMESTER PREGNANCY: ICD-10-CM

## 2025-02-07 DIAGNOSIS — K59.01 SLOW TRANSIT CONSTIPATION: ICD-10-CM

## 2025-02-07 PROCEDURE — 99203 OFFICE O/P NEW LOW 30 MIN: CPT | Performed by: OBSTETRICS & GYNECOLOGY

## 2025-02-07 PROCEDURE — 76817 TRANSVAGINAL US OBSTETRIC: CPT | Performed by: OBSTETRICS & GYNECOLOGY

## 2025-02-07 RX ORDER — DOCUSATE SODIUM 100 MG/1
100 CAPSULE, LIQUID FILLED ORAL 2 TIMES DAILY
Qty: 180 CAPSULE | Refills: 1 | Status: SHIPPED | OUTPATIENT
Start: 2025-02-07

## 2025-02-07 RX ORDER — PNV NO.95/FERROUS FUM/FOLIC AC 28MG-0.8MG
1 TABLET ORAL DAILY
Start: 2025-02-07

## 2025-02-07 NOTE — PROGRESS NOTES
Assessment  35 y.o.  presenting for amenorrhea. Pregnancy confirmed, dating consistent with LMP. LASHANDA 2025 .    Plan  Diagnoses and all orders for this visit:    Amenorrhea  -     AMB US OB < 14 weeks single or first gestation level 1    First trimester pregnancy  - Prenatal vitamin  - Discussed and referred for genetic screening  - Prenatal Nursing Intake in 2 weeks  - Prenatal H&P in 4 weeks     Slow transit constipation  -     docusate sodium (COLACE) 100 mg capsule; Take 1 capsule (100 mg total) by mouth 2 (two) times a day      ____________________________________________________________      Subjective   Merry Venegas is a 35 y.o.  with an LMP of Patient's last menstrual period was 2024. (9w4d by LMP) who presents for amenorrhea. She notes she took a home pregnancy test and it was positive. She is currently otherwise without complaint.    Nausea noted in 1st tri. Seems to be improving now. Hx headaches/migraines, but these improved after stopping OCP and have not returned. Some constipation noted too    Patient notes that this pregnancy was planned but not specifically timed. She was not using contraception at the time. She reports she is certain of her LMP and that she has regular menses. She has has no vaginal bleeding since her LMP.  First pregnancy; first grandchild for both of their parents too.     The following portions of the patient's history were reviewed and updated as appropriate: allergies, current medications, past medical history, past social history, past surgical history, and problem list.    Review of Systems  Review of Systems   Constitutional:  Negative for chills, fatigue and fever.   Respiratory:  Negative for shortness of breath.    Cardiovascular:  Negative for chest pain and palpitations.   Gastrointestinal:  Negative for abdominal distention, abdominal pain, constipation, diarrhea, nausea and vomiting.   Genitourinary:  Negative for dysuria and flank pain.       "    Objective  /70   Ht 5' 4\" (1.626 m)   Wt 61.2 kg (135 lb)   LMP 12/02/2024   BMI 23.17 kg/m²       Physical Exam:  Physical Exam  Vitals reviewed. Exam conducted with a chaperone present.   Constitutional:       General: She is not in acute distress.     Appearance: Normal appearance. She is not ill-appearing, toxic-appearing or diaphoretic.   Eyes:      General: No scleral icterus.        Right eye: No discharge.         Left eye: No discharge.      Conjunctiva/sclera: Conjunctivae normal.   Cardiovascular:      Rate and Rhythm: Normal rate.   Pulmonary:      Effort: Pulmonary effort is normal. No respiratory distress.   Abdominal:      General: There is no distension.      Palpations: There is no mass.      Tenderness: There is no abdominal tenderness. There is no guarding or rebound.      Hernia: No hernia is present.   Genitourinary:     General: Normal vulva.      Exam position: Lithotomy position.      Labia:         Right: No rash, tenderness or lesion.         Left: No rash, tenderness or lesion.       Urethra: No prolapse, urethral swelling or urethral lesion.      Vagina: No bleeding.   Musculoskeletal:         General: No swelling.   Skin:     General: Skin is warm and dry.      Coloration: Skin is not jaundiced or pale.      Findings: No bruising or erythema.   Neurological:      Mental Status: She is alert.   Psychiatric:         Mood and Affect: Mood normal.         Behavior: Behavior normal.         Thought Content: Thought content normal.         Judgment: Judgment normal.               "

## 2025-02-13 ENCOUNTER — TELEPHONE (OUTPATIENT)
Age: 35
End: 2025-02-13

## 2025-02-13 NOTE — TELEPHONE ENCOUNTER
Pt calling in stating that she has a cold, and would like a list of medications she can take. Pt was sent the pregnancy essentials guide via [employer], no further questions at this time.    no

## 2025-02-14 ENCOUNTER — OFFICE VISIT (OUTPATIENT)
Dept: FAMILY MEDICINE CLINIC | Facility: CLINIC | Age: 35
End: 2025-02-14
Payer: COMMERCIAL

## 2025-02-14 VITALS
BODY MASS INDEX: 24.01 KG/M2 | HEART RATE: 86 BPM | SYSTOLIC BLOOD PRESSURE: 102 MMHG | WEIGHT: 140.6 LBS | HEIGHT: 64 IN | OXYGEN SATURATION: 99 % | DIASTOLIC BLOOD PRESSURE: 68 MMHG | TEMPERATURE: 98.4 F

## 2025-02-14 DIAGNOSIS — J06.9 UPPER RESPIRATORY TRACT INFECTION, UNSPECIFIED TYPE: Primary | ICD-10-CM

## 2025-02-14 PROCEDURE — 99213 OFFICE O/P EST LOW 20 MIN: CPT | Performed by: FAMILY MEDICINE

## 2025-02-14 RX ORDER — AMOXICILLIN 500 MG/1
500 CAPSULE ORAL EVERY 8 HOURS SCHEDULED
Qty: 30 CAPSULE | Refills: 0 | Status: SHIPPED | OUTPATIENT
Start: 2025-02-14 | End: 2025-02-24

## 2025-02-14 NOTE — PROGRESS NOTES
Name: Merry Venegas      : 1990      MRN: [id number]  Encounter Provider: Randy Kerr DO  Encounter Date: 2025   Encounter department: [facility]  :  Assessment & Plan  Upper respiratory tract infection, unspecified type  Patient will also notify obstetrician of her illness.  She does have a sheet saying what is safe to use while pregnant.  Claritin is on that sheet.  Patient will use Claritin at bedtime to help reduce postnasal drip.  She has also been using.  The symptoms have been going on over a week and has purulent rhinorrhea with sinus tenderness.  Will treat with amoxicillin for 10 days.  Patient will update her obstetrician.  Orders:    amoxicillin (AMOXIL) 500 mg capsule; Take 1 capsule (500 mg total) by mouth every 8 (eight) hours for 10 days           History of Present Illness   The patient is a very pleasant 35-year-old female who is pregnant and followed by [facility] obstetricians.  The patient developed URI symptoms approximately greater than 1 week ago.  She now has sinus pressure, sinus pain, purulent rhinorrhea and an occasional cough.  She has postnasal drip which keeps her up at night.  She has been taking Robitussin without relief.  Her obstetrician advised her to come to the family doctor for evaluation.  The patient has no abdominal pain, vaginal bleeding or vaginal drainage.  No fevers or chills.  Tolerating oral intake.  No ill contacts.    Sore Throat   Associated symptoms include congestion and coughing. Pertinent negatives include no abdominal pain, ear pain, shortness of breath or vomiting.     Review of Systems   Constitutional:  Positive for fatigue. Negative for chills and fever.   HENT:  Positive for congestion, postnasal drip, rhinorrhea, sinus pressure, sinus pain and sore throat. Negative for ear pain.    Eyes:  Negative for pain and visual disturbance.   Respiratory:  Positive for cough. Negative for shortness of breath.    Cardiovascular:   "Negative for chest pain and palpitations.   Gastrointestinal:  Negative for abdominal pain and vomiting.   Genitourinary:  Negative for dysuria and hematuria.   Musculoskeletal:  Negative for arthralgias and back pain.   Skin:  Negative for color change and rash.   Neurological:  Negative for seizures and syncope.   Psychiatric/Behavioral: Negative.     All other systems reviewed and are negative.      Objective   /68   Pulse 86   Temp 98.4 °F (36.9 °C)   Ht 5' 4\" (1.626 m)   Wt 63.8 kg (140 lb 9.6 oz)   LMP 12/02/2024   SpO2 99%   BMI 24.13 kg/m²      Physical Exam  Vitals and nursing note reviewed.   Constitutional:       General: She is not in acute distress.     Appearance: She is well-developed. She is ill-appearing. She is not toxic-appearing or diaphoretic.   HENT:      Head: Normocephalic and atraumatic.      Right Ear: Tympanic membrane normal.      Left Ear: Tympanic membrane normal.      Nose: Congestion and rhinorrhea present.      Mouth/Throat:      Mouth: Mucous membranes are moist. No oral lesions.      Pharynx: Posterior oropharyngeal erythema present.      Tonsils: No tonsillar exudate or tonsillar abscesses.   Eyes:      Conjunctiva/sclera: Conjunctivae normal.   Cardiovascular:      Rate and Rhythm: Normal rate and regular rhythm.      Heart sounds: Normal heart sounds. No murmur heard.     No friction rub.   Pulmonary:      Effort: Pulmonary effort is normal. No respiratory distress.      Breath sounds: Normal breath sounds.   Abdominal:      Palpations: Abdomen is soft.      Tenderness: There is no abdominal tenderness.   Musculoskeletal:         General: No swelling.      Cervical back: Neck supple.   Skin:     General: Skin is warm and dry.      Capillary Refill: Capillary refill takes less than 2 seconds.   Neurological:      General: No focal deficit present.      Mental Status: She is alert and oriented to person, place, and time.   Psychiatric:         Mood and Affect: Mood " normal.         Behavior: Behavior normal.

## 2025-02-20 ENCOUNTER — INITIAL PRENATAL (OUTPATIENT)
Dept: OBGYN CLINIC | Facility: CLINIC | Age: 35
End: 2025-02-20

## 2025-02-20 ENCOUNTER — TELEPHONE (OUTPATIENT)
Age: 35
End: 2025-02-20

## 2025-02-20 VITALS
SYSTOLIC BLOOD PRESSURE: 90 MMHG | WEIGHT: 140.8 LBS | HEIGHT: 65 IN | DIASTOLIC BLOOD PRESSURE: 60 MMHG | BODY MASS INDEX: 23.46 KG/M2

## 2025-02-20 DIAGNOSIS — Z34.01 ENCOUNTER FOR SUPERVISION OF NORMAL FIRST PREGNANCY IN FIRST TRIMESTER: Primary | ICD-10-CM

## 2025-02-20 DIAGNOSIS — Z13.71 SCREENING FOR GENETIC DISEASE CARRIER STATUS: ICD-10-CM

## 2025-02-20 PROCEDURE — NOBC

## 2025-02-20 NOTE — TELEPHONE ENCOUNTER
Patient called stating she checked with her insurance and prior authorization is required. Nuchal translucency is 2/27/25 at [facility].

## 2025-02-20 NOTE — PROGRESS NOTES
OB INTAKE INTERVIEW 2025    Patient is 35 y.o. who presents for OB intake at 11w3d.  She is accompanied by her spouse during this encounter.  The father of her baby (Angus Venegas) is involved in the pregnancy and he is 37 years old.      Patient's last menstrual period was 2024.  Ultrasound: Measured 10 weeks 0 days on 2025  Estimated Date of Delivery: 25 confirmed by dating ultrasound.    Signs/Symptoms of Pregnancy  Current pregnancy symptoms: fatigue and constipation  Headaches no  Cramping no  Spotting no  PICA cravings no    Diabetes-  Body mass index is 23.43 kg/m².  If patient has 1 or more, please order early 1 hour GTT  History of GDM no  BMI >35 no  History of PCOS or current metformin use no  History of LGA/macrosomic infant (4000g/9lbs) no    If patient has 2 or more, please order early 1 hour GTT  BMI>30 no  AMA YES  First degree relative with type 2 diabetes no  History of chronic HTN, hyperlipidemia, elevated A1C no  High risk race (, , ,  or ) no    Hypertension- if you answer yes to any of the following, please order baseline preeclampsia labs (cbc, comprehensive metabolic panel, urine protein creatinine ratio, uric acid)  History of of chronic HTN no  History of gestational HTN no  History of preeclampsia, eclampsia, or HELLP syndrome no  History of diabetes no  History of lupus,sjogrens syndrome, kidney disease no    Thyroid- if yes order TSH with reflex T4  History of thyroid disease no    Bleeding Disorder or Hx of DVT-patient or first degree relative with history of. Order the following if not done previously.   (Factor V, antithrombin III, prothrombin gene mutation, protein C and S Ag, lupus anticoagulant, anticardiolipin, beta-2 glycoprotein)   no    OB/GYN-  History of abnormal pap smear no       Date of last pap smear 2020  History of HPV no  History of Herpes/HSV no  History of other STI  (gonorrhea, chlamydia, trich) no  History of prior  no  History of prior  no  History of  delivery prior to 36 weeks 6 days no  History of blood transfusion no  Ok for blood transfusion  Yes    Substance screening-   History of tobacco use YES  Currently using tobacco no  Substance Use Screen Level:  N/A    MRSA Screening-   Does the pt have a hx of MRSA? no    Immunizations:  Influenza vaccine given this season: NO   History of Varicella or Vaccination: YES   Discussed Tdap vaccine:  YES  Discussed COVID Vaccine:  YES    Genetic/MFM-  Do you or your partner have a history of any of the following in yourselves or first degree relatives?  Cystic fibrosis no  Spinal muscular atrophy no  Hemoglobinopathy/Sickle Cell/Thalassemia no  Fragile X Intellectual Disability no    If yes, discuss Carrier Screening and recommend consultation with MFM/Genetic Counseling and place specific [facility] Referral for.    If no, discuss Carrier Screening being completed once in a lifetime as a standard of care lab test. Place orders for Cystic Fibrosis Gene Test ([id number]) and Spinal Muscular Atrophy DNA ([id number]).  Patient was informed that prior authorization needs to be completed for these tests and this may take 7-10 business days.  Patient does desire testing for Cystic Fibrosis and Spinal Muscular Atrophy.    Appointment for Nuchal Translucency Ultrasound at [facility] is scheduled for 2025.    Interview education  [facility] Pregnancy Essentials Book reviewed, discussed and attached to their AVS:  YES    Nurse/Family Partnership-patient may qualify NO; referral placed No     Prenatal lab work scripts YES    Extra labs ordered: Cystic Fibrosis gene test, Spinal muscular atrophy DNA, and Hgb Fractionation Cascade    Aspirin/Preeclampsia Screen    Risk Level Risk Factor Recommendation   LOW Prior Uncomplicated full-term delivery no No Aspirin recommendation        MODERATE Nulliparity YES Recommend low-dose aspirin if      BMI>30 no 2 or more moderate risk factors    Family History Preeclampsia (mother/sister) no     35yr old or greater YES     Black Race, Concern for SDOH/Low Socioeconomic no     IVF Pregnancy  no     Personal History Risks (low birth weight, prior adverse preg outcome, >10yr preg interval) no         HIGH History of Preeclampsia no Recommend low-dose aspirin if     Multifetal gestation no 1 or more high risk factors    Chronic HTN no     Type 1 or 2 Diabetes no     Renal Disease no     Autoimmune Disease  no      Contraindications to ASA therapy:  NSAID/ ASA allergy: no  Nasal polyps: no  Asthma with history of ASA induced bronchospasm: no  Relative contraindications:  History of GI bleed: no  Active peptic ulcer disease: no  Severe hepatic dysfunction: no    Patient does meet recommendation to take ASA 162mg during this pregnancy from 12-36 wks to lower her risk of preeclampsia.  Instructions given and patient verbalized understanding.    Mental Health:  History of depression: YES  History of anxiety: no  EPDS Screen:  Negative   Score:  0    Dental Exam:  Last dental exam within the past 6 months: Yes    The patient has a history now or in prior pregnancy notable for: AMA.    Details that I feel the provider should be aware of: Patient has a history of depression.  Also has a history of migraines which improved after discontinuing birth control.  Currently taking magnesium per neurologist.     PN1 visit scheduled. The patient was oriented to our practice and the navigator role.  Reviewed delivering physicians and [facility] for delivery. All questions were answered.    Interviewed by: COLETTE THEODORE LPN

## 2025-02-20 NOTE — PATIENT INSTRUCTIONS
Congratulations on your pregnancy!  We thank you for allowing us to participate in your care.    NEXT STEPS    Go to the lab to have your prenatal blood work competed, if you have not already done so.  We ask that you have this blood work done prior to your first routine prenatal appointment.  There is a listing of [facility] Laboratories and locations in your prenatal folder. You may also visit [email or web address] or call [phone].   Please be aware that some insurance companies may require you to go to a specific lab ([employer] or [employer]). You can verify this by contacting your insurance company.   If you have decided to be screened for CF and SMA genetic testing, these tests require prior authorization and scheduling.  Prior Authorization is not a guarantee of payment. There may be out of pocket expenses that includes copays, deductibles and or coinsurance for your individual plan.  Please call [phone] if our team has not contacted you in 7 business days.  A referral was placed to Maternal Fetal Medicine for an ultrasound and or genetic testing.  You may have already scheduled or have had this appointment.  If not, please call their office to schedule.  Based on the referral placed by our office, they will know how to schedule you appropriately.    The phone number for Maternal Fetal Medicine is [phone]. For additional detailed contact information for Maternal Fetal Medicine, please refer to the prenatal folder provided to you by our office.   Return to our office for your first routine prenatal visit.   Some offices have multiple locations. Always check the address of your appointment to ensure you are going to the correct office.   If you experience any problems or concerns, call the office directly.   Remember to only use [employer] for none urgent concerns or questions.  Our doctors deliver at [facility] in [place]. The address is provided below.     [facility]   [address]  [place] [address]    Click on the links below to review our Pregnancy Essential Guide.  [facility] Pregnancy Essentials Guide  [facility] ([email or web address])     Click on the link below to review [facility] Lab locations.  [facility] Lab Locations    [employer] resource  Findhelp is a tool to connect you to community resources you may need.    Thank you,  Brittni ATWOOD LPN  OB Nurse Navigator

## 2025-02-25 ENCOUNTER — TELEPHONE (OUTPATIENT)
Age: 35
End: 2025-02-25

## 2025-02-25 NOTE — TELEPHONE ENCOUNTER
Labs were faxed to [employer] as patient requested and Confirmation was received. Labs and Confirmation Fax were scanned in patient's chart. Called patient and made her aware. Patient stated she would go tomorrow to have labs done.

## 2025-02-27 ENCOUNTER — ROUTINE PRENATAL (OUTPATIENT)
Dept: PERINATAL CARE | Facility: OTHER | Age: 35
End: 2025-02-27
Payer: COMMERCIAL

## 2025-02-27 VITALS
HEIGHT: 65 IN | WEIGHT: 142.2 LBS | HEART RATE: 81 BPM | SYSTOLIC BLOOD PRESSURE: 100 MMHG | BODY MASS INDEX: 23.69 KG/M2 | DIASTOLIC BLOOD PRESSURE: 60 MMHG

## 2025-02-27 DIAGNOSIS — O09.511 PRIMIGRAVIDA OF ADVANCED MATERNAL AGE IN FIRST TRIMESTER: ICD-10-CM

## 2025-02-27 DIAGNOSIS — Z34.91 FIRST TRIMESTER PREGNANCY: ICD-10-CM

## 2025-02-27 DIAGNOSIS — Z91.89 AT RISK FOR HYPERTENSION: ICD-10-CM

## 2025-02-27 DIAGNOSIS — Z3A.12 12 WEEKS GESTATION OF PREGNANCY: Primary | ICD-10-CM

## 2025-02-27 DIAGNOSIS — Z36.82 ENCOUNTER FOR NUCHAL TRANSLUCENCY TESTING: ICD-10-CM

## 2025-02-27 PROCEDURE — 76813 OB US NUCHAL MEAS 1 GEST: CPT | Performed by: OBSTETRICS & GYNECOLOGY

## 2025-02-27 PROCEDURE — 76801 OB US < 14 WKS SINGLE FETUS: CPT | Performed by: OBSTETRICS & GYNECOLOGY

## 2025-02-27 PROCEDURE — 99203 OFFICE O/P NEW LOW 30 MIN: CPT | Performed by: OBSTETRICS & GYNECOLOGY

## 2025-02-27 RX ORDER — ASPIRIN 81 MG/1
162 TABLET ORAL DAILY
Qty: 90 TABLET | Refills: 2 | Status: SHIPPED | OUTPATIENT
Start: 2025-02-27

## 2025-02-27 NOTE — PROGRESS NOTES
Patient has [employer]  insurance. Patient was offered the self pay option through [facility] but declined. Explained to patient her insurance requires a prior authorization before [facility] NIPS can be drawn. In-basket message routed to Maternal Fetal Medicine clinical pool to complete prior authorization. Our office will contact the patient within 10-14 business days with the status of authorization. If patient does not hear back from our Maternal Fetal Medicine office after 14 business days to please call [phone].     Patient is aware if this test is drawn without prior authorization she may be responsible for full cost of test. Insurance Authorization is not a guarantee of payment and final determination is based on your member benefits, appropriateness of service provided and eligibility at time of services rendered and claim received.

## 2025-02-28 ENCOUNTER — TELEPHONE (OUTPATIENT)
Facility: HOSPITAL | Age: 35
End: 2025-02-28

## 2025-02-28 LAB
ABO GROUP BLD: NORMAL
APPEARANCE UR: ABNORMAL
BACTERIA UR QL AUTO: ABNORMAL /HPF
BASOPHILS # BLD AUTO: 63 CELLS/UL (ref 0–200)
BASOPHILS NFR BLD AUTO: 0.7 %
BILIRUB UR QL STRIP: NEGATIVE
BLD GP AB SCN SERPL QL: NORMAL
COLOR UR: YELLOW
EOSINOPHIL # BLD AUTO: 90 CELLS/UL (ref 15–500)
EOSINOPHIL NFR BLD AUTO: 1 %
ERYTHROCYTE [DISTWIDTH] IN BLOOD BY AUTOMATED COUNT: 12.7 % (ref 11–15)
ERYTHROCYTE [DISTWIDTH] IN BLOOD BY AUTOMATED COUNT: 12.7 % (ref 11–15)
FERRITIN SERPL-MCNC: 57 NG/ML (ref 16–154)
GLUCOSE UR QL STRIP: NEGATIVE
HBV SURFACE AB SERPL IA-ACNC: 51 MIU/ML
HBV SURFACE AG SERPL QL IA: NORMAL
HCT VFR BLD AUTO: 35.5 % (ref 35–45)
HCT VFR BLD AUTO: 35.5 % (ref 35–45)
HCV AB SERPL QL IA: NORMAL
HGB A MFR BLD: 97.2 %
HGB A2 MFR BLD: 2.8 % (ref 2–3.2)
HGB BLD-MCNC: 11.8 G/DL (ref 11.7–15.5)
HGB BLD-MCNC: 11.8 G/DL (ref 11.7–15.5)
HGB F MFR BLD: <1 %
HGB FRACT BLD-IMP: ABNORMAL
HGB UR QL STRIP: NEGATIVE
HIV 1+2 AB+HIV1 P24 AG SERPL QL IA: NORMAL
HYALINE CASTS #/AREA URNS LPF: ABNORMAL /LPF
KETONES UR QL STRIP: NEGATIVE
LEUKOCYTE ESTERASE UR QL STRIP: ABNORMAL
LYMPHOCYTES # BLD AUTO: 1350 CELLS/UL (ref 850–3900)
LYMPHOCYTES NFR BLD AUTO: 15 %
MCH RBC QN AUTO: 31.4 PG (ref 27–33)
MCH RBC QN AUTO: 31.4 PG (ref 27–33)
MCHC RBC AUTO-ENTMCNC: 33.2 G/DL (ref 32–36)
MCV RBC AUTO: 94.4 FL (ref 80–100)
MCV RBC AUTO: 94.4 FL (ref 80–100)
MONOCYTES # BLD AUTO: 612 CELLS/UL (ref 200–950)
MONOCYTES NFR BLD AUTO: 6.8 %
NEUTROPHILS # BLD AUTO: 6885 CELLS/UL (ref 1500–7800)
NEUTROPHILS NFR BLD AUTO: 76.5 %
NITRITE UR QL STRIP: NEGATIVE
PH UR STRIP: ABNORMAL [PH] (ref 5–8)
PLATELET # BLD AUTO: 281 THOUSAND/UL (ref 140–400)
PMV BLD REES-ECKER: 10.5 FL (ref 7.5–12.5)
PROT UR QL STRIP: NEGATIVE
RBC # BLD AUTO: 3.76 MILLION/UL (ref 3.8–5.1)
RBC # BLD AUTO: 3.76 MILLION/UL (ref 3.8–5.1)
RBC #/AREA URNS HPF: ABNORMAL /HPF
RH BLD: NORMAL
RPR SER QL: NORMAL
RUBV IGG SERPL IA-ACNC: 11.8 INDEX
SP GR UR STRIP: 1.01 (ref 1–1.03)
SQUAMOUS #/AREA URNS HPF: ABNORMAL /HPF
URATE CRY #/AREA URNS HPF: ABNORMAL /HPF
WBC # BLD AUTO: 9 THOUSAND/UL (ref 3.8–10.8)
WBC #/AREA URNS HPF: ABNORMAL /HPF

## 2025-02-28 NOTE — TELEPHONE ENCOUNTER
Spoke with Comfort at [employer] ([phone]) to inquire if/ initiate authorization for NIPS (91668).  Per [employer]fort- coverage is active and prior authorization or pre-determination is not required for CPT code 46145.  Call reference # [id number].      Phone call to patient and notified insurance plan does not require authorization for NIPT.    Encouraged patient to determine if she met her lab deductible requirement with insurance plan.    For complete NIPT billing information patient can visit  [email or web address]  or call [employer]  @ [phone]  Pt was receptive and declines questions at this time.  Appointment made per pts request for 3/4/25 @ 5244 in [place].

## 2025-02-28 NOTE — TELEPHONE ENCOUNTER
----- Message from Florencio NOLAN sent at 2025  9:39 AM EST -----  Regarding: FW: NIPT Nabila  I called Nabila they transferred me to [facility] ..... As per [facility] patient does not have active coverage. They suggested I call Nabila back request Benefits/eligibility line and see what they can do or ask for a 3-way call with [facility] to see if they can override for a prior auth for NIPT.  I was already on for 35+ minutes only to find all this out. If ANYONE has time feel free to call ...... If not I can try again some other time or I will call the patient and they can figure it out.   Nabila   NIPT [id number]   Dr. Coy   O09.511,   Z36.0  Had NT 25  Thanks/sorry     ZULEMA  ----- Message -----  From: Florencio Pitts RN  Sent: 2025   3:37 PM EST  To: [facility]  Subject: NIPT Nabila Dahl   NIPT [id number]   Dr. Coy   O09.511, Z36.0  Had NT today   I tried but did not work in portal ... Will call in AM unless someone wants to try portal again     ~G

## 2025-03-03 ENCOUNTER — INITIAL PRENATAL (OUTPATIENT)
Dept: OBGYN CLINIC | Facility: CLINIC | Age: 35
End: 2025-03-03

## 2025-03-03 VITALS — DIASTOLIC BLOOD PRESSURE: 60 MMHG | WEIGHT: 141.6 LBS | SYSTOLIC BLOOD PRESSURE: 108 MMHG | BODY MASS INDEX: 23.56 KG/M2

## 2025-03-03 DIAGNOSIS — Z86.59 HISTORY OF DEPRESSION: ICD-10-CM

## 2025-03-03 DIAGNOSIS — Z3A.13 13 WEEKS GESTATION OF PREGNANCY: ICD-10-CM

## 2025-03-03 DIAGNOSIS — G43.009 MIGRAINE WITHOUT AURA AND WITHOUT STATUS MIGRAINOSUS, NOT INTRACTABLE: ICD-10-CM

## 2025-03-03 DIAGNOSIS — Z34.92 SECOND TRIMESTER PREGNANCY: ICD-10-CM

## 2025-03-03 DIAGNOSIS — O09.512 PRIMIGRAVIDA OF ADVANCED MATERNAL AGE IN SECOND TRIMESTER: Primary | ICD-10-CM

## 2025-03-03 PROBLEM — O09.522 ADVANCED MATERNAL AGE IN MULTIGRAVIDA, SECOND TRIMESTER: Status: ACTIVE | Noted: 2025-03-03

## 2025-03-03 PROCEDURE — 87591 N.GONORRHOEAE DNA AMP PROB: CPT | Performed by: ADVANCED PRACTICE MIDWIFE

## 2025-03-03 PROCEDURE — 87491 CHLMYD TRACH DNA AMP PROBE: CPT | Performed by: ADVANCED PRACTICE MIDWIFE

## 2025-03-03 PROCEDURE — G0145 SCR C/V CYTO,THINLAYER,RESCR: HCPCS | Performed by: ADVANCED PRACTICE MIDWIFE

## 2025-03-03 PROCEDURE — PNV: Performed by: ADVANCED PRACTICE MIDWIFE

## 2025-03-03 PROCEDURE — G0476 HPV COMBO ASSAY CA SCREEN: HCPCS | Performed by: ADVANCED PRACTICE MIDWIFE

## 2025-03-03 NOTE — ASSESSMENT & PLAN NOTE
- Prenatal labs reviewed and normal.  Blood type: A positive  - Aneuploidy screening discussed.  Patient has appointment tomorrow with [facility].  - Routine cervical cancer screening: Pap Done today.  - Routine STI Screening: GC/Chlamydia sent today.  HIV/Hep B/Syphilis ordered in prenatal panel.  - Patient Education: Patient was counseled regarding diet, exercise, weight gain, foods to avoid, vaccines in pregnancy, aneuploidy screening, travel precautions to include seat belt use and VTE risk reduction.  She has been provided our pregnancy packet which includes how and when to contact providers, medication recommendations, dietary suggestions, breastfeeding information as well as websites for additional information, hospital and delivery concerns.   162 mg ASA daily until 36 weeks  Next visit 4 weeks      Orders:  •  Liquid-based pap, screening  •  Chlamydia/GC amplified DNA by PCR  •  Alpha fetoprotein, maternal; Future  •  HPV High Risk

## 2025-03-03 NOTE — PROGRESS NOTES
Name: Merry Venegas      : 1990      MRN: [id number]  Encounter Provider: Gifty Meehan CNM  Encounter Date: 3/3/2025   Encounter department: [facility] [place]  :  Assessment & Plan  Primigravida of advanced maternal age in second trimester  162 mg ASA daily until 36 weeks    Orders:  •  Alpha fetoprotein, maternal; Future    Migraine without aura and without status migrainosus, not intractable  Followed by neurology  Currently on magnesium       History of depression         Second trimester pregnancy    Orders:  •  Liquid-based pap, screening  •  Chlamydia/GC amplified DNA by PCR  •  Alpha fetoprotein, maternal; Future  •  HPV High Risk    13 weeks gestation of pregnancy  - Prenatal labs reviewed and normal.  Blood type: A positive  - Aneuploidy screening discussed.  Patient has appointment tomorrow with [facility].  - Routine cervical cancer screening: Pap Done today.  - Routine STI Screening: GC/Chlamydia sent today.  HIV/Hep B/Syphilis ordered in prenatal panel.  - Patient Education: Patient was counseled regarding diet, exercise, weight gain, foods to avoid, vaccines in pregnancy, aneuploidy screening, travel precautions to include seat belt use and VTE risk reduction.  She has been provided our pregnancy packet which includes how and when to contact providers, medication recommendations, dietary suggestions, breastfeeding information as well as websites for additional information, hospital and delivery concerns.   162 mg ASA daily until 36 weeks  Next visit 4 weeks      Orders:  •  Liquid-based pap, screening  •  Chlamydia/GC amplified DNA by PCR  •  Alpha fetoprotein, maternal; Future  •  HPV High Risk        History of Present Illness   Merry Venegas is a 35 y.o.  female who presents for routine prenatal care at 13w0d.  Pregnancy ROS: no leakage of fluid, pelvic pain, or vaginal bleeding.  None- Nausea/vomiting.     Objective:  /60   Wt 64.2 kg (141 lb 9.6 oz)    LMP 2024   BMI 23.56 kg/m²   Pregravid Weight/BMI: 64 kg (141 lb) (BMI 23.46)  Current Weight: 64.2 kg (141 lb 9.6 oz)   Total Weight Gain: 0.272 kg (9.6 oz)   Pre- Vitals    Flowsheet Row Most Recent Value   Prenatal Assessment    Fetal Heart Rate 144   Prenatal Vitals    Blood Pressure 108/60   Weight - Scale 64.2 kg (141 lb 9.6 oz)   Urine Albumin/Glucose    Dilation/Effacement/Station    Vaginal Drainage    Edema    LLE Edema None   RLE Edema None           Merry Venegas is a 35 y.o. female who presents for initial prenatal visit  History obtained from: patient    Review of Systems   Constitutional:  Negative for chills and fever.   Respiratory:  Negative for cough and shortness of breath.    Cardiovascular:  Negative for chest pain and palpitations.   Genitourinary:  Negative for difficulty urinating and vaginal bleeding.   Psychiatric/Behavioral:  The patient is not nervous/anxious.      Medical History Reviewed by provider this encounter:     .  Current Outpatient Medications on File Prior to Visit   Medication Sig Dispense Refill   • docusate sodium (COLACE) 100 mg capsule Take 1 capsule (100 mg total) by mouth 2 (two) times a day 180 capsule 1   • Prenatal Vit-Fe Fumarate-FA (Prenatal Vitamin and Mineral) 28-0.8 MG TABS Take 1 tablet by mouth in the morning     • aspirin (ECOTRIN LOW STRENGTH) 81 mg EC tablet Take 2 tablets (162 mg total) by mouth daily (Patient not taking: Reported on 3/3/2025) 90 tablet 2   • SUMAtriptan (IMITREX) 100 mg tablet Take 1 tablet (100 mg total) by mouth once as needed for migraine May repeat x1 in 2 hours, max 2/24 hours, 9/month (Patient not taking: Reported on 3/3/2025) 9 tablet 11     No current facility-administered medications on file prior to visit.         Objective   /60   Wt 64.2 kg (141 lb 9.6 oz)   LMP 2024   BMI 23.56 kg/m²      Physical Exam  Vitals reviewed.   Constitutional:       Appearance: Normal appearance.   Pulmonary:       Effort: Pulmonary effort is normal.   Abdominal:      Comments: Gravid uterus   Neurological:      Mental Status: She is alert and oriented to person, place, and time.   Psychiatric:         Mood and Affect: Mood normal.         Behavior: Behavior normal.

## 2025-03-04 ENCOUNTER — OFFICE VISIT (OUTPATIENT)
Dept: PERINATAL CARE | Facility: OTHER | Age: 35
End: 2025-03-04
Payer: COMMERCIAL

## 2025-03-04 ENCOUNTER — TELEPHONE (OUTPATIENT)
Age: 35
End: 2025-03-04

## 2025-03-04 DIAGNOSIS — O09.511 SUPERVISION OF ELDERLY PRIMIGRAVIDA IN FIRST TRIMESTER: Primary | ICD-10-CM

## 2025-03-04 DIAGNOSIS — Z3A.13 13 WEEKS GESTATION OF PREGNANCY: ICD-10-CM

## 2025-03-04 DIAGNOSIS — Z36.0 ENCOUNTER FOR ANTENATAL SCREENING FOR CHROMOSOMAL ANOMALIES: ICD-10-CM

## 2025-03-04 LAB
HPV HR 12 DNA CVX QL NAA+PROBE: NEGATIVE
HPV16 DNA CVX QL NAA+PROBE: NEGATIVE
HPV18 DNA CVX QL NAA+PROBE: NEGATIVE

## 2025-03-04 PROCEDURE — 36415 COLL VENOUS BLD VENIPUNCTURE: CPT | Performed by: NURSE PRACTITIONER

## 2025-03-04 NOTE — TELEPHONE ENCOUNTER
Interval History:   Patient reported to refuse IV Depakote sprinkles were given and tolerated.     Review of Systems   Reason unable to perform ROS: asleep/somnolent.   Objective:     Vital Signs (Most Recent):  Temp: 98.6 °F (37 °C) (08/02/22 1619)  Pulse: 86 (08/02/22 1619)  Resp: 16 (08/02/22 1619)  BP: (!) 115/56 (08/02/22 1619)  SpO2: 99 % (08/02/22 1619)   Vital Signs (24h Range):  Temp:  [97.9 °F (36.6 °C)-98.6 °F (37 °C)] 98.6 °F (37 °C)  Pulse:  [] 86  Resp:  [16-20] 16  SpO2:  [93 %-99 %] 99 %  BP: (115-142)/(56-70) 115/56     Weight: 80.3 kg (177 lb)  Body mass index is 31.35 kg/m².  No intake or output data in the 24 hours ending 08/02/22 1859   Physical Exam  Constitutional:       Comments: In bed sleeping and somnolent when called   HENT:      Head: Normocephalic and atraumatic.      Mouth/Throat:      Dentition: Does not have dentures.   Cardiovascular:      Rate and Rhythm: Normal rate.      Pulses: Normal pulses.      Heart sounds:     No friction rub.   Pulmonary:      Breath sounds: No wheezing or rhonchi.      Comments: Breathing rapid shallow breaths 27pm  Abdominal:      General: There is no distension.      Palpations: There is no mass.   Musculoskeletal:         General: No swelling or tenderness.   Lymphadenopathy:      Cervical: No cervical adenopathy.   Skin:     General: Skin is warm.   Neurological:      Mental Status: She is disoriented.       Significant Labs: All pertinent labs within the past 24 hours have been reviewed.  Recent Lab Results       None            Significant Imaging: I have reviewed all pertinent imaging results/findings within the past 24 hours.   Received call from Mercedes keane Genetic Assistant with [employer]. States they received an order for the Cystic Fibrosis Gene Deletion or Duplication test. She wants to clarify if this should be the Cystic Fibrosis screening instead as that will test for 161 different mutations. Advised will review with MD.     Call back [phone] ext: [phone]  Voicemail is secure, can leave detailed voicemail

## 2025-03-04 NOTE — PROGRESS NOTES
Patient chose to have [facility] [id number] Non-Invasive Prenatal Screen [id number] PLUS w/ SCA, WITH fetal sex.  Patient choose [facility]'s self-pay option of $299.     Patient given brochure and is aware [facility] will contact patient's insurance and coordinate coverage.  Provided [facility] contact information. General inquiries [phone], Cost estimates [phone] and [facility] Billing [phone]. Website [email or web address].     Blood collection tubes labeled with patient identifiers (name, medical record number, and date of birth).     Filled out [facility] order form. Patient chose to have blood drawn in our office at time of visit. NIPS was drawn from left arm with a butterfly needle by Fallon CHOPRA MA. .      Maternal Fetal Medicine will have results in approximately 5-7 business days and will call patient or notify via [employer].  Patient aware viewing lab result online will reveal fetal sex if ordered.    Patient verbalized understanding of all instructions and no questions at this time.

## 2025-03-04 NOTE — TELEPHONE ENCOUNTER
Mercedes from [facility] returning call. States she received message per April. She denies need for new order at this time. She will submit the request to the local [facility] and if a new order is required, they will reach back out.

## 2025-03-04 NOTE — TELEPHONE ENCOUNTER
LM for Mercedes to call back to discuss if verbal confirmation is sufficient or if she needs different order.

## 2025-03-04 NOTE — TELEPHONE ENCOUNTER
Yes, the screening test is what was intended. Please let me know if needs different order or if verbal confirmation sufficient

## 2025-03-05 LAB
C TRACH DNA SPEC QL NAA+PROBE: NEGATIVE
N GONORRHOEA DNA SPEC QL NAA+PROBE: NEGATIVE

## 2025-03-08 LAB
CFDNA.FET/CFDNA.TOTAL SFR FETUS: NORMAL %
CFDNA.FET/CFDNA.TOTAL SFR FETUS: NORMAL %
CITATION REF LAB TEST: NORMAL
FET 13+18+21+X+Y ANEUP PLAS.CFDNA: NEGATIVE
FET CHR 21 TS PLAS.CFDNA QL: NEGATIVE
FET CHR 21 TS PLAS.CFDNA QL: NEGATIVE
FET MS X RISK WBC.DNA+CFDNA QL: NOT DETECTED
FET SEX PLAS.CFDNA DOSAGE CFDNA: NORMAL
FET TS 13 RISK PLAS.CFDNA QL: NEGATIVE
FET X + Y ANEUP RISK PLAS.CFDNA SEQ-IMP: NOT DETECTED
GA EST FROM CONCEPTION DATE: NORMAL D
GESTATIONAL AGE > 9:: YES
LAB DIRECTOR NAME PROVIDER: NORMAL
LABORATORY COMMENT REPORT: NORMAL
LIMITATIONS OF THE TEST: NORMAL
NEGATIVE PREDICTIVE VALUE: NORMAL
PERFORMANCE CHARACTERISTICS: NORMAL
POSITIVE PREDICTIVE VALUE: NORMAL
REF LAB TEST METHOD: NORMAL
SL AMB NOTE:: NORMAL
TEST PERFORMANCE INFO SPEC: NORMAL

## 2025-03-09 LAB
ABO GROUP BLD: NORMAL
APPEARANCE UR: ABNORMAL
BACTERIA UR QL AUTO: ABNORMAL /HPF
BASOPHILS # BLD AUTO: 63 CELLS/UL (ref 0–200)
BASOPHILS NFR BLD AUTO: 0.7 %
BILIRUB UR QL STRIP: NEGATIVE
BLD GP AB SCN SERPL QL: NORMAL
CFTR MUT ANL BLD/T: NEGATIVE
CFTR MUT ANL BLD/T: NORMAL
CFTR MUT ANL BLD/T: NORMAL
CFTR MUT TESTED BLD/T: NORMAL
COLOR UR: YELLOW
EOSINOPHIL # BLD AUTO: 90 CELLS/UL (ref 15–500)
EOSINOPHIL NFR BLD AUTO: 1 %
ERYTHROCYTE [DISTWIDTH] IN BLOOD BY AUTOMATED COUNT: 12.7 % (ref 11–15)
ERYTHROCYTE [DISTWIDTH] IN BLOOD BY AUTOMATED COUNT: 12.7 % (ref 11–15)
FERRITIN SERPL-MCNC: 57 NG/ML (ref 16–154)
GLUCOSE UR QL STRIP: NEGATIVE
HBV SURFACE AB SERPL IA-ACNC: 51 MIU/ML
HBV SURFACE AG SERPL QL IA: NORMAL
HCT VFR BLD AUTO: 35.5 % (ref 35–45)
HCT VFR BLD AUTO: 35.5 % (ref 35–45)
HCV AB SERPL QL IA: NORMAL
HGB A MFR BLD: 97.2 %
HGB A2 MFR BLD: 2.8 % (ref 2–3.2)
HGB BLD-MCNC: 11.8 G/DL (ref 11.7–15.5)
HGB BLD-MCNC: 11.8 G/DL (ref 11.7–15.5)
HGB F MFR BLD: <1 %
HGB FRACT BLD-IMP: ABNORMAL
HGB UR QL STRIP: NEGATIVE
HIV 1+2 AB+HIV1 P24 AG SERPL QL IA: NORMAL
HYALINE CASTS #/AREA URNS LPF: ABNORMAL /LPF
KETONES UR QL STRIP: NEGATIVE
LEUKOCYTE ESTERASE UR QL STRIP: ABNORMAL
LYMPHOCYTES # BLD AUTO: 1350 CELLS/UL (ref 850–3900)
LYMPHOCYTES NFR BLD AUTO: 15 %
Lab: NORMAL
MCH RBC QN AUTO: 31.4 PG (ref 27–33)
MCH RBC QN AUTO: 31.4 PG (ref 27–33)
MCHC RBC AUTO-ENTMCNC: 33.2 G/DL (ref 32–36)
MCV RBC AUTO: 94.4 FL (ref 80–100)
MCV RBC AUTO: 94.4 FL (ref 80–100)
MONOCYTES # BLD AUTO: 612 CELLS/UL (ref 200–950)
MONOCYTES NFR BLD AUTO: 6.8 %
NEUTROPHILS # BLD AUTO: 6885 CELLS/UL (ref 1500–7800)
NEUTROPHILS NFR BLD AUTO: 76.5 %
NITRITE UR QL STRIP: NEGATIVE
PH UR STRIP: ABNORMAL [PH] (ref 5–8)
PLATELET # BLD AUTO: 281 THOUSAND/UL (ref 140–400)
PMV BLD REES-ECKER: 10.5 FL (ref 7.5–12.5)
PROT UR QL STRIP: NEGATIVE
RBC # BLD AUTO: 3.76 MILLION/UL (ref 3.8–5.1)
RBC # BLD AUTO: 3.76 MILLION/UL (ref 3.8–5.1)
RBC #/AREA URNS HPF: ABNORMAL /HPF
RH BLD: NORMAL
RPR SER QL: NORMAL
RUBV IGG SERPL IA-ACNC: 11.8 INDEX
SP GR UR STRIP: 1.01 (ref 1–1.03)
SQUAMOUS #/AREA URNS HPF: ABNORMAL /HPF
URATE CRY #/AREA URNS HPF: ABNORMAL /HPF
WBC # BLD AUTO: 9 THOUSAND/UL (ref 3.8–10.8)
WBC #/AREA URNS HPF: ABNORMAL /HPF

## 2025-03-10 ENCOUNTER — RESULTS FOLLOW-UP (OUTPATIENT)
Dept: OBGYN CLINIC | Facility: CLINIC | Age: 35
End: 2025-03-10

## 2025-03-10 ENCOUNTER — RESULTS FOLLOW-UP (OUTPATIENT)
Dept: PERINATAL CARE | Facility: CLINIC | Age: 35
End: 2025-03-10

## 2025-03-10 LAB
LAB AP GYN PRIMARY INTERPRETATION: NORMAL
LAB AP LMP: NORMAL
Lab: NORMAL

## 2025-03-20 LAB
ABO GROUP BLD: NORMAL
APPEARANCE UR: ABNORMAL
BACTERIA UR QL AUTO: ABNORMAL /HPF
BASOPHILS # BLD AUTO: 63 CELLS/UL (ref 0–200)
BASOPHILS NFR BLD AUTO: 0.7 %
BILIRUB UR QL STRIP: NEGATIVE
BLD GP AB SCN SERPL QL: NORMAL
CFTR MUT ANL BLD/T: NEGATIVE
CFTR MUT ANL BLD/T: NORMAL
CFTR MUT ANL BLD/T: NORMAL
CFTR MUT TESTED BLD/T: NORMAL
COLOR UR: YELLOW
EOSINOPHIL # BLD AUTO: 90 CELLS/UL (ref 15–500)
EOSINOPHIL NFR BLD AUTO: 1 %
ERYTHROCYTE [DISTWIDTH] IN BLOOD BY AUTOMATED COUNT: 12.7 % (ref 11–15)
ERYTHROCYTE [DISTWIDTH] IN BLOOD BY AUTOMATED COUNT: 12.7 % (ref 11–15)
FERRITIN SERPL-MCNC: 57 NG/ML (ref 16–154)
GLUCOSE UR QL STRIP: NEGATIVE
HBV SURFACE AB SERPL IA-ACNC: 51 MIU/ML
HBV SURFACE AG SERPL QL IA: NORMAL
HCT VFR BLD AUTO: 35.5 % (ref 35–45)
HCT VFR BLD AUTO: 35.5 % (ref 35–45)
HCV AB SERPL QL IA: NORMAL
HGB A MFR BLD: 97.2 %
HGB A2 MFR BLD: 2.8 % (ref 2–3.2)
HGB BLD-MCNC: 11.8 G/DL (ref 11.7–15.5)
HGB BLD-MCNC: 11.8 G/DL (ref 11.7–15.5)
HGB F MFR BLD: <1 %
HGB FRACT BLD-IMP: ABNORMAL
HGB UR QL STRIP: NEGATIVE
HIV 1+2 AB+HIV1 P24 AG SERPL QL IA: NORMAL
HYALINE CASTS #/AREA URNS LPF: ABNORMAL /LPF
INTERPRETATION: NORMAL
KETONES UR QL STRIP: NEGATIVE
LEUKOCYTE ESTERASE UR QL STRIP: ABNORMAL
LYMPHOCYTES # BLD AUTO: 1350 CELLS/UL (ref 850–3900)
LYMPHOCYTES NFR BLD AUTO: 15 %
Lab: NORMAL
MCH RBC QN AUTO: 31.4 PG (ref 27–33)
MCH RBC QN AUTO: 31.4 PG (ref 27–33)
MCHC RBC AUTO-ENTMCNC: 33.2 G/DL (ref 32–36)
MCV RBC AUTO: 94.4 FL (ref 80–100)
MCV RBC AUTO: 94.4 FL (ref 80–100)
MONOCYTES # BLD AUTO: 612 CELLS/UL (ref 200–950)
MONOCYTES NFR BLD AUTO: 6.8 %
NEUTROPHILS # BLD AUTO: 6885 CELLS/UL (ref 1500–7800)
NEUTROPHILS NFR BLD AUTO: 76.5 %
NITRITE UR QL STRIP: NEGATIVE
PH UR STRIP: ABNORMAL [PH] (ref 5–8)
PLATELET # BLD AUTO: 281 THOUSAND/UL (ref 140–400)
PMV BLD REES-ECKER: 10.5 FL (ref 7.5–12.5)
PROT UR QL STRIP: NEGATIVE
RBC # BLD AUTO: 3.76 MILLION/UL (ref 3.8–5.1)
RBC # BLD AUTO: 3.76 MILLION/UL (ref 3.8–5.1)
RBC #/AREA URNS HPF: ABNORMAL /HPF
RH BLD: NORMAL
RPR SER QL: NORMAL
RUBV IGG SERPL IA-ACNC: 11.8 INDEX
SMA 2+0 RISK VARIANT: NOT DETECTED
SMN1 GENE MUT ANL BLD/T: NORMAL
SP GR UR STRIP: 1.01 (ref 1–1.03)
SQUAMOUS #/AREA URNS HPF: ABNORMAL /HPF
TECHNICAL RESULTS: NEGATIVE
URATE CRY #/AREA URNS HPF: ABNORMAL /HPF
WBC # BLD AUTO: 9 THOUSAND/UL (ref 3.8–10.8)
WBC #/AREA URNS HPF: ABNORMAL /HPF

## 2025-04-02 ENCOUNTER — TELEPHONE (OUTPATIENT)
Dept: OBGYN CLINIC | Facility: CLINIC | Age: 35
End: 2025-04-02

## 2025-04-05 LAB
ABO GROUP BLD: NORMAL
BLD GP AB SCN SERPL QL: NORMAL
HIV 1+2 AB+HIV1 P24 AG SERPL QL IA: NORMAL
RH BLD: NORMAL

## 2025-04-07 ENCOUNTER — ROUTINE PRENATAL (OUTPATIENT)
Dept: OBGYN CLINIC | Facility: CLINIC | Age: 35
End: 2025-04-07

## 2025-04-07 VITALS — WEIGHT: 145 LBS | DIASTOLIC BLOOD PRESSURE: 80 MMHG | BODY MASS INDEX: 24.13 KG/M2 | SYSTOLIC BLOOD PRESSURE: 126 MMHG

## 2025-04-07 DIAGNOSIS — Z34.92 SECOND TRIMESTER PREGNANCY: Primary | ICD-10-CM

## 2025-04-07 DIAGNOSIS — Z3A.18 18 WEEKS GESTATION OF PREGNANCY: ICD-10-CM

## 2025-04-07 DIAGNOSIS — O09.512 PRIMIGRAVIDA OF ADVANCED MATERNAL AGE IN SECOND TRIMESTER: ICD-10-CM

## 2025-04-07 PROCEDURE — PNV: Performed by: OBSTETRICS & GYNECOLOGY

## 2025-04-07 NOTE — PROGRESS NOTES
Assessment  35 y.o.  at 18w0d presenting for routine prenatal visit.     Plan  Diagnoses and all orders for this visit:    Second trimester pregnancy  18 weeks gestation of pregnancy  - Second trimester precautions  - Normal movement  - Level 2 scheduled  - Return in 4wk for PN    Primigravida of advanced maternal age in second trimester  -     NIPT wnl  - Alpha fetoprotein, maternal; Future  -     Thought AFP was collected, but HIV/ABO/Rh repeated instead. No indication/order for same; sent message to clinical team to assist on lab f/u  - Follows with MFM    ____________________________________________________________        Subjective    Merry Venegas is a 35 y.o.  at 18w0d who presents for routine prenatal visit. She is noting some pelvic pulling, radiating into groin. Reviewed round ligament pain. Denies contractions, loss of fluid, or vaginal bleeding. She feels fetal movements.     Pregnancy Problems:  Patient Active Problem List   Diagnosis    Dysthymia    Migraine without aura and without status migrainosus, not intractable    Primigravida of advanced maternal age in second trimester    History of depression    18 weeks gestation of pregnancy         Objective  /80   Wt 65.8 kg (145 lb)   LMP 2024   BMI 24.13 kg/m²     FHT: 139 BPM   Uterine Size: size equals dates     Physical Exam:  Physical Exam  Constitutional:       General: She is not in acute distress.     Appearance: Normal appearance. She is well-developed. She is not ill-appearing, toxic-appearing or diaphoretic.   HENT:      Head: Normocephalic and atraumatic.   Eyes:      General: No scleral icterus.        Right eye: No discharge.         Left eye: No discharge.      Conjunctiva/sclera: Conjunctivae normal.   Pulmonary:      Effort: Pulmonary effort is normal. No accessory muscle usage or respiratory distress.   Abdominal:      General: There is distension (gravid).      Tenderness: There is no abdominal tenderness.  There is no guarding or rebound.   Skin:     General: Skin is warm and dry.      Coloration: Skin is not jaundiced.      Findings: No bruising, erythema or rash.   Neurological:      Mental Status: She is alert.   Psychiatric:         Mood and Affect: Mood normal.         Behavior: Behavior normal.         Thought Content: Thought content normal.         Judgment: Judgment normal.

## 2025-04-11 ENCOUNTER — TELEPHONE (OUTPATIENT)
Age: 35
End: 2025-04-11

## 2025-04-11 NOTE — TELEPHONE ENCOUNTER
Pt called in, would like the claim for [id number] PLUS Core+SCA  to be reversed , she was supposed to to a self pay. Pt stated that it costs more to be billed by her insurance than self pay.    Number Of Freeze-Thaw Cycles: 2 freeze-thaw cycles Render Note In Bullet Format When Appropriate: No Consent: The patient's consent was obtained including but not limited to risks of crusting, scabbing, blistering, scarring, darker or lighter pigmentary change, recurrence, incomplete removal and infection. Post-Care Instructions: I reviewed with the patient in detail post-care instructions. Patient is to wear sunprotection, and avoid picking at any of the treated lesions. Pt may apply Vaseline to crusted or scabbing areas. Detail Level: Detailed Duration Of Freeze Thaw-Cycle (Seconds): 5 Show Aperture Variable?: Yes

## 2025-04-14 ENCOUNTER — TELEPHONE (OUTPATIENT)
Dept: PERINATAL CARE | Facility: OTHER | Age: 35
End: 2025-04-14

## 2025-04-14 LAB
# FETUSES US: 1
AFP ADJ MOM SERPL: 1.1
AFP INTERP SERPL-IMP: NORMAL
AFP SERPL-MCNC: 54.9 NG/ML
AGE: NORMAL
DONATED EGG PATIENT QL: NO
GA CLIN EST: 18.7 WEEKS
GA METHOD: NORMAL
HX OF NTD NARR: NO
HX OF TRISOMY 21 NARR: NO
IDDM PATIENT QL: NO
NEURAL TUBE DEFECT RISK FETUS: NORMAL %
SL AMB REPEAT SPECIMEN: NO

## 2025-04-14 NOTE — TELEPHONE ENCOUNTER
We received a message from Lois Clements   Pt called in, would like the claim for [id number] PLUS Core+SCA  ,she was supposed to to a self pay. Pt stated that it costs more to be billed by her insurance than self pay.     I spoke with pt ,I explain to her she has to contact [facility] sign up for the Every Mom Pledge. She will then take claim, even if her insurance was billed, [facility] would help fix that once she is signed up .

## 2025-04-29 ENCOUNTER — ROUTINE PRENATAL (OUTPATIENT)
Dept: PERINATAL CARE | Facility: OTHER | Age: 35
End: 2025-04-29
Payer: COMMERCIAL

## 2025-04-29 VITALS
BODY MASS INDEX: 24.86 KG/M2 | HEIGHT: 65 IN | WEIGHT: 149.2 LBS | SYSTOLIC BLOOD PRESSURE: 100 MMHG | DIASTOLIC BLOOD PRESSURE: 68 MMHG | HEART RATE: 72 BPM

## 2025-04-29 DIAGNOSIS — Z3A.21 21 WEEKS GESTATION OF PREGNANCY: ICD-10-CM

## 2025-04-29 DIAGNOSIS — Z36.86 ENCOUNTER FOR ANTENATAL SCREENING FOR CERVICAL LENGTH: ICD-10-CM

## 2025-04-29 DIAGNOSIS — Z36.3 ENCOUNTER FOR ANTENATAL SCREENING FOR MALFORMATIONS: ICD-10-CM

## 2025-04-29 DIAGNOSIS — O09.512 PRIMIGRAVIDA OF ADVANCED MATERNAL AGE IN SECOND TRIMESTER: Primary | ICD-10-CM

## 2025-04-29 PROCEDURE — 99213 OFFICE O/P EST LOW 20 MIN: CPT | Performed by: OBSTETRICS & GYNECOLOGY

## 2025-04-29 PROCEDURE — 76811 OB US DETAILED SNGL FETUS: CPT | Performed by: OBSTETRICS & GYNECOLOGY

## 2025-04-29 PROCEDURE — 76817 TRANSVAGINAL US OBSTETRIC: CPT | Performed by: OBSTETRICS & GYNECOLOGY

## 2025-04-29 RX ORDER — MAGNESIUM L-LACTATE 84 MG
250 TABLET, EXTENDED RELEASE ORAL DAILY
COMMUNITY

## 2025-04-29 NOTE — PROGRESS NOTES
"[facility]: Merry Venegas was seen today for anatomic survey and cervical length screening ultrasound.  See ultrasound report under \"OB Procedures\" tab.   Please don't hesitate to contact our office with any concerns or questions.  -Nayla Dawson MD      "

## 2025-04-29 NOTE — PROGRESS NOTES
Ultrasound Probe Disinfection    A transvaginal ultrasound was performed.   Prior to use, disinfection was performed with High Level Disinfection Process ([employer]).  Probe serial number [id number]: [id number]  was used.    Chaperone: Fallon Snell RDMS

## 2025-05-06 ENCOUNTER — ROUTINE PRENATAL (OUTPATIENT)
Dept: OBGYN CLINIC | Facility: CLINIC | Age: 35
End: 2025-05-06

## 2025-05-06 VITALS — SYSTOLIC BLOOD PRESSURE: 112 MMHG | WEIGHT: 151.2 LBS | DIASTOLIC BLOOD PRESSURE: 66 MMHG | BODY MASS INDEX: 25.16 KG/M2

## 2025-05-06 DIAGNOSIS — O09.512 PRIMIGRAVIDA OF ADVANCED MATERNAL AGE IN SECOND TRIMESTER: Primary | ICD-10-CM

## 2025-05-06 DIAGNOSIS — Z3A.22 22 WEEKS GESTATION OF PREGNANCY: ICD-10-CM

## 2025-05-06 PROCEDURE — PNV: Performed by: STUDENT IN AN ORGANIZED HEALTH CARE EDUCATION/TRAINING PROGRAM

## 2025-05-06 NOTE — PROGRESS NOTES
Name: Merry Venegas      : 1990      MRN: [id number]  Encounter Provider: Gloria Manrique MD  Encounter Date: 2025   Encounter department: [facility]  :  Assessment & Plan  Primigravida of advanced maternal age in second trimester         22 weeks gestation of pregnancy             History of Present Illness   The patient denies leakage of fluid, pelvic pain, or vaginal bleeding.  Reports normal fetal movement.        Merry Venegas is a 35 y.o. female who presents for routine prenatal care.    History obtained from: patient    Review of Systems   Constitutional:  Negative for chills and fever.   Respiratory:  Negative for cough and shortness of breath.    Cardiovascular:  Negative for chest pain and leg swelling.   Gastrointestinal:  Negative for abdominal pain, nausea and vomiting.   Genitourinary:  Negative for dysuria, frequency and urgency.   Neurological:  Negative for dizziness, light-headedness and headaches.          Objective   /66   Wt 68.6 kg (151 lb 3.2 oz)   LMP 2024   BMI 25.16 kg/m²      Physical Exam  Constitutional:       Appearance: Normal appearance.   HENT:      Head: Normocephalic and atraumatic.   Cardiovascular:      Rate and Rhythm: Normal rate.   Pulmonary:      Effort: Pulmonary effort is normal.   Skin:     General: Skin is warm.   Neurological:      General: No focal deficit present.      Mental Status: She is alert.   Psychiatric:         Mood and Affect: Mood normal.

## 2025-05-29 DIAGNOSIS — Z91.89 AT RISK FOR HYPERTENSION: ICD-10-CM

## 2025-05-29 RX ORDER — ASPIRIN 81 MG/1
162 TABLET ORAL DAILY
Qty: 90 TABLET | Refills: 0 | Status: SHIPPED | OUTPATIENT
Start: 2025-05-29

## 2025-06-04 ENCOUNTER — ROUTINE PRENATAL (OUTPATIENT)
Dept: OBGYN CLINIC | Facility: CLINIC | Age: 35
End: 2025-06-04

## 2025-06-04 VITALS — BODY MASS INDEX: 25.79 KG/M2 | WEIGHT: 155 LBS | DIASTOLIC BLOOD PRESSURE: 70 MMHG | SYSTOLIC BLOOD PRESSURE: 102 MMHG

## 2025-06-04 DIAGNOSIS — Z3A.26 26 WEEKS GESTATION OF PREGNANCY: Primary | ICD-10-CM

## 2025-06-04 DIAGNOSIS — O09.512 PRIMIGRAVIDA OF ADVANCED MATERNAL AGE IN SECOND TRIMESTER: ICD-10-CM

## 2025-06-04 PROCEDURE — PNV: Performed by: OBSTETRICS & GYNECOLOGY

## 2025-06-04 RX ORDER — MULTIVITAMIN WITH IRON
TABLET ORAL
COMMUNITY

## 2025-06-04 NOTE — PROGRESS NOTES
Routine Prenatal Visit  [facility] [address] [facility]      OB/GYN Prenatal Visit    ASSESSMENT / PLAN:  1. 26 weeks gestation of pregnancy  -     Glucose, 1H PG; Future; Expected date: Collect anytime  -     Anemia Panel w/Reflex, OB; Future; Expected date: Collect anytime  -     CBC and differential; Future; Expected date: Collect anytime  -     RPR-Syphilis Screening (Total Syphilis IGG/IGM); Future; Expected date: Collect anytime  -     Glucose, 1H PG  -     CBC and differential  2. Primigravida of advanced maternal age in second trimester  Assessment & Plan:  NIPT and AFP low risk   Level 2 reviewed  Next scan - 7/15/25    28 week ordered          SUBJECTIVE:  Merry Venegas is a 35 y.o.  at 26 here for prenatal visit.  She has no obstetric complaints and denies pelvic pain, cramping/contractions, vaginal bleeding, loss of fluid, or decreased fetal movement.       The following portions of the patient's history were reviewed and updated as appropriate: allergies, current medications, past family history, past medical history, obstetric history, gynecologic history, past social history, past surgical history and problem list.      Objective:  /70   Wt 70.3 kg (155 lb)   LMP 2024   BMI 25.79 kg/m²   Pregravid Weight/BMI: 64 kg (141 lb) (BMI 23.46)  Current Weight: 70.3 kg (155 lb)   Total Weight Gain: 6.35 kg (14 lb)   Pre-Krunal Vitals      Flowsheet Row Most Recent Value   Prenatal Assessment    Fetal Heart Rate 140   Movement Present   Prenatal Vitals    Blood Pressure 102/70   Weight - Scale 70.3 kg (155 lb)   Urine Albumin/Glucose    Dilation/Effacement/Station    Vaginal Drainage    Draining Fluid No   Edema    LLE Edema None   RLE Edema None               Physical Exam:    General: Well appearing, no distress  Respiratory: Unlabored breathing  Cardiovascular: Regular rate.  Abdomen: Soft, gravid, nontender  Fundal Height: Appropriate for  gestational age.  Extremities: Warm and well perfused.  Non tender.      Richy Berry MD

## 2025-06-18 ENCOUNTER — ROUTINE PRENATAL (OUTPATIENT)
Dept: OBGYN CLINIC | Facility: CLINIC | Age: 35
End: 2025-06-18

## 2025-06-18 VITALS — DIASTOLIC BLOOD PRESSURE: 72 MMHG | WEIGHT: 156.6 LBS | BODY MASS INDEX: 26.06 KG/M2 | SYSTOLIC BLOOD PRESSURE: 102 MMHG

## 2025-06-18 DIAGNOSIS — O09.513 PRIMIGRAVIDA OF ADVANCED MATERNAL AGE IN THIRD TRIMESTER: ICD-10-CM

## 2025-06-18 DIAGNOSIS — Z34.93 THIRD TRIMESTER PREGNANCY: Primary | ICD-10-CM

## 2025-06-18 DIAGNOSIS — Z3A.28 28 WEEKS GESTATION OF PREGNANCY: ICD-10-CM

## 2025-06-18 PROCEDURE — PNV: Performed by: OBSTETRICS & GYNECOLOGY

## 2025-06-18 NOTE — PROGRESS NOTES
Assessment  35 y.o.  at 28w2d presenting for routine prenatal visit.     Plan  Diagnoses and all orders for this visit:    Third trimester pregnancy  28 weeks gestation of pregnancy  - PTL precautions  - C teaching  - Birth plan given, peds list given, birth consent signed  - 28 wk labs pending; encouraged completion  - Tdap discussed. Will consider for next visit. Recommendations reviewed for family vaccination  - Rhogam not indicated  - Return in 2wk for PN    Primigravida of advanced maternal age in third trimester  - Follows with MFM  - NIPT/AFP wnl    ____________________________________________________________        Subjective    Merry Venegas is a 35 y.o.  at 28w2d who presents for routine prenatal visit. She is without complaint. She denies contractions, loss of fluid, or vaginal bleeding. She feels regular fetal movements.     Pregnancy Problems:  Problem List[1]      Objective  /72   Wt 71 kg (156 lb 9.6 oz)   LMP 2024   BMI 26.06 kg/m²     FHT: 133 BPM   Uterine Size: 28 cm     Physical Exam:  Physical Exam  Constitutional:       General: She is not in acute distress.     Appearance: Normal appearance. She is well-developed. She is not ill-appearing, toxic-appearing or diaphoretic.   HENT:      Head: Normocephalic and atraumatic.     Eyes:      General: No scleral icterus.        Right eye: No discharge.         Left eye: No discharge.      Conjunctiva/sclera: Conjunctivae normal.     Pulmonary:      Effort: Pulmonary effort is normal. No accessory muscle usage or respiratory distress.   Abdominal:      General: There is distension (gravid).      Tenderness: There is no abdominal tenderness. There is no guarding or rebound.     Skin:     General: Skin is warm and dry.      Coloration: Skin is not jaundiced.      Findings: No bruising, erythema or rash.     Neurological:      Mental Status: She is alert.     Psychiatric:         Mood and Affect: Mood normal.         Behavior:  Behavior normal.         Thought Content: Thought content normal.         Judgment: Judgment normal.                [1]   Patient Active Problem List  Diagnosis    Dysthymia    Migraine without aura and without status migrainosus, not intractable    Primigravida of advanced maternal age in third trimester    History of depression    28 weeks gestation of pregnancy

## 2025-06-25 ENCOUNTER — TELEPHONE (OUTPATIENT)
Dept: FAMILY MEDICINE CLINIC | Facility: CLINIC | Age: 35
End: 2025-06-25

## 2025-06-25 NOTE — TELEPHONE ENCOUNTER
6/25 left vm for pt to call and reschedule nolberto phys appointment through [facility] as there is not enough time scheduled for appt, no time to extend , appt originally sched through [facility]..

## 2025-06-26 ENCOUNTER — TELEPHONE (OUTPATIENT)
Dept: OBGYN CLINIC | Facility: MEDICAL CENTER | Age: 35
End: 2025-06-26

## 2025-06-26 ENCOUNTER — TELEPHONE (OUTPATIENT)
Age: 35
End: 2025-06-26

## 2025-06-26 NOTE — TELEPHONE ENCOUNTER
Who called:STAFF     Is the patient Pregnant ?  Yes  If so, How many weeks? 28 wks    Reason for the Call: LVM to get fax number for [employer]    Action Taken: LVM    Outcome/Plan/ Recommendations: LVM to get fax number for [employer]

## 2025-06-26 NOTE — TELEPHONE ENCOUNTER
Patient calling in, asking for labs to be faxed to [facility] at [phone].  Labs faxed as requested.

## 2025-06-27 ENCOUNTER — TELEPHONE (OUTPATIENT)
Age: 35
End: 2025-06-27

## 2025-06-27 ENCOUNTER — TELEPHONE (OUTPATIENT)
Dept: OBGYN CLINIC | Facility: CLINIC | Age: 35
End: 2025-06-27

## 2025-06-27 DIAGNOSIS — Z34.93 THIRD TRIMESTER PREGNANCY: ICD-10-CM

## 2025-06-27 DIAGNOSIS — Z3A.28 28 WEEKS GESTATION OF PREGNANCY: Primary | ICD-10-CM

## 2025-06-27 NOTE — TELEPHONE ENCOUNTER
Placed call to patient, had to leave voice message, 28 week labs are in the chart to have done at [facility] per patient request.

## 2025-06-27 NOTE — TELEPHONE ENCOUNTER
Patient called office to request lab order forms be printed at [facility] so she can pick them up today as she states she is unable to print 2 of the lab slips off at home. Advised the office is closed until MOnday. Offered to fax to [facility], she declines and states she had issues with this before. Offered to have her  at a different location, she declines states all locations are out of the way. She will attempt to see if PCP will print for her and call back if they cannot.

## 2025-06-28 LAB
BASOPHILS # BLD AUTO: 31 CELLS/UL (ref 0–200)
BASOPHILS # BLD AUTO: 40 CELLS/UL (ref 0–200)
BASOPHILS NFR BLD AUTO: 0.3 %
BASOPHILS NFR BLD AUTO: 0.4 %
EOSINOPHIL # BLD AUTO: 112 CELLS/UL (ref 15–500)
EOSINOPHIL # BLD AUTO: 121 CELLS/UL (ref 15–500)
EOSINOPHIL NFR BLD AUTO: 1.1 %
EOSINOPHIL NFR BLD AUTO: 1.2 %
ERYTHROCYTE [DISTWIDTH] IN BLOOD BY AUTOMATED COUNT: 11.6 % (ref 11–15)
ERYTHROCYTE [DISTWIDTH] IN BLOOD BY AUTOMATED COUNT: 11.8 % (ref 11–15)
FERRITIN SERPL-MCNC: 10 NG/ML (ref 16–154)
GLUCOSE 1H P 50 G GLC PO SERPL-MCNC: 127 MG/DL
HCT VFR BLD AUTO: 34.2 % (ref 35–45)
HCT VFR BLD AUTO: 34.4 % (ref 35–45)
HGB BLD-MCNC: 11.4 G/DL (ref 11.7–15.5)
HGB BLD-MCNC: 11.4 G/DL (ref 11.7–15.5)
IRON SATN MFR SERPL: 11 % (CALC) (ref 16–45)
IRON SERPL-MCNC: 51 MCG/DL (ref 40–190)
LYMPHOCYTES # BLD AUTO: 1364 CELLS/UL (ref 850–3900)
LYMPHOCYTES # BLD AUTO: 1377 CELLS/UL (ref 850–3900)
LYMPHOCYTES NFR BLD AUTO: 13.5 %
LYMPHOCYTES NFR BLD AUTO: 13.5 %
MCH RBC QN AUTO: 32.4 PG (ref 27–33)
MCH RBC QN AUTO: 32.8 PG (ref 27–33)
MCHC RBC AUTO-ENTMCNC: 33.1 G/DL (ref 32–36)
MCHC RBC AUTO-ENTMCNC: 33.3 G/DL (ref 32–36)
MCV RBC AUTO: 97.7 FL (ref 80–100)
MCV RBC AUTO: 98.3 FL (ref 80–100)
MONOCYTES # BLD AUTO: 694 CELLS/UL (ref 200–950)
MONOCYTES # BLD AUTO: 717 CELLS/UL (ref 200–950)
MONOCYTES NFR BLD AUTO: 6.8 %
MONOCYTES NFR BLD AUTO: 7.1 %
NEUTROPHILS # BLD AUTO: 7858 CELLS/UL (ref 1500–7800)
NEUTROPHILS # BLD AUTO: 7987 CELLS/UL (ref 1500–7800)
NEUTROPHILS NFR BLD AUTO: 77.8 %
NEUTROPHILS NFR BLD AUTO: 78.3 %
PLATELET # BLD AUTO: 209 THOUSAND/UL (ref 140–400)
PLATELET # BLD AUTO: 218 THOUSAND/UL (ref 140–400)
PMV BLD REES-ECKER: 10.8 FL (ref 7.5–12.5)
PMV BLD REES-ECKER: 11.1 FL (ref 7.5–12.5)
RBC # BLD AUTO: 3.48 MILLION/UL (ref 3.8–5.1)
RBC # BLD AUTO: 3.52 MILLION/UL (ref 3.8–5.1)
RPR SER QL: NORMAL
TIBC SERPL-MCNC: 457 MCG/DL (CALC) (ref 250–450)
WBC # BLD AUTO: 10.1 THOUSAND/UL (ref 3.8–10.8)
WBC # BLD AUTO: 10.2 THOUSAND/UL (ref 3.8–10.8)

## 2025-06-30 ENCOUNTER — ROUTINE PRENATAL (OUTPATIENT)
Dept: OBGYN CLINIC | Facility: CLINIC | Age: 35
End: 2025-06-30
Payer: COMMERCIAL

## 2025-06-30 VITALS — DIASTOLIC BLOOD PRESSURE: 56 MMHG | BODY MASS INDEX: 26.43 KG/M2 | SYSTOLIC BLOOD PRESSURE: 110 MMHG | WEIGHT: 158.8 LBS

## 2025-06-30 DIAGNOSIS — O09.513 PRIMIGRAVIDA OF ADVANCED MATERNAL AGE IN THIRD TRIMESTER: ICD-10-CM

## 2025-06-30 DIAGNOSIS — R79.0 LOW FERRITIN LEVEL: ICD-10-CM

## 2025-06-30 DIAGNOSIS — Z34.93 THIRD TRIMESTER PREGNANCY: Primary | ICD-10-CM

## 2025-06-30 DIAGNOSIS — Z3A.30 30 WEEKS GESTATION OF PREGNANCY: ICD-10-CM

## 2025-06-30 DIAGNOSIS — Z23 ENCOUNTER FOR IMMUNIZATION: ICD-10-CM

## 2025-06-30 PROCEDURE — 90715 TDAP VACCINE 7 YRS/> IM: CPT | Performed by: ADVANCED PRACTICE MIDWIFE

## 2025-06-30 PROCEDURE — PNV: Performed by: ADVANCED PRACTICE MIDWIFE

## 2025-06-30 PROCEDURE — 90471 IMMUNIZATION ADMIN: CPT | Performed by: ADVANCED PRACTICE MIDWIFE

## 2025-06-30 RX ORDER — FERROUS SULFATE 324(65)MG
324 TABLET, DELAYED RELEASE (ENTERIC COATED) ORAL
Qty: 30 TABLET | Refills: 3 | Status: SHIPPED | OUTPATIENT
Start: 2025-06-30

## 2025-06-30 NOTE — ASSESSMENT & PLAN NOTE
Reviewed signs and symptoms PTL, FKC  1 hour glucose screening normal  Low ferritin  - Returned birth plan.   Growth scan 7/15/2025  Next visit 2 weeks    Orders:    Tdap Vaccine greater than or equal to 6yo

## 2025-06-30 NOTE — ASSESSMENT & PLAN NOTE
NIPT and AFP low risk   Level 2 reviewed  Next scan - 7/15/25  162 mg ASA daily until 36 weeks

## 2025-06-30 NOTE — PROGRESS NOTES
Name: Merry Venegas      : 1990      MRN: [id number]  Encounter Provider: Gifty Meehan CNM  Encounter Date: 2025   Encounter department: [facility] [place]  :  Assessment & Plan  Primigravida of advanced maternal age in third trimester         Third trimester pregnancy    Orders:    Tdap Vaccine greater than or equal to 8yo    30 weeks gestation of pregnancy  Reviewed signs and symptoms PTL, FKC  1 hour glucose screening normal  Low ferritin  - Returned birth plan.   Growth scan 7/15/2025  Next visit 2 weeks    Orders:    Tdap Vaccine greater than or equal to 8yo        History of Present Illness   Merry Venegas is a 35 y.o.  female who presents for routine prenatal care at 30w0d.  Pregnancy ROS: no leakage of fluid, pelvic pain, or vaginal bleeding.  Good fetal movement.    Objective:  /56   Wt 72 kg (158 lb 12.8 oz)   LMP 2024   BMI 26.43 kg/m²   Pregravid Weight/BMI: 64 kg (141 lb) (BMI 23.46)  Current Weight: 72 kg (158 lb 12.8 oz)   Total Weight Gain: 8.074 kg (17 lb 12.8 oz)   Pre- Vitals    Flowsheet Row Most Recent Value   Prenatal Assessment    Fetal Heart Rate 148   Movement Present   Prenatal Vitals    Blood Pressure 110/56   Weight - Scale 72 kg (158 lb 12.8 oz)   Urine Albumin/Glucose    Dilation/Effacement/Station    Vaginal Drainage    Edema    LLE Edema None   RLE Edema None          Merry Venegas is a 35 y.o. female who presents for prenatal visit  History obtained from: patient    Review of Systems   Constitutional:  Negative for chills and fever.   Respiratory:  Negative for cough and shortness of breath.    Cardiovascular:  Negative for chest pain and palpitations.   Genitourinary:  Negative for difficulty urinating and vaginal bleeding.   Psychiatric/Behavioral:  The patient is not nervous/anxious.      Medical History Reviewed by provider this encounter:     .  Medications Ordered Prior to Encounter[1]      Objective    LMP 12/02/2024      Physical Exam  Vitals reviewed.   Constitutional:       Appearance: Normal appearance.   Pulmonary:      Effort: Pulmonary effort is normal.   Abdominal:      Comments: Gravid uterus     Neurological:      Mental Status: She is alert and oriented to person, place, and time.     Psychiatric:         Mood and Affect: Mood normal.         Behavior: Behavior normal.              [1]   Current Outpatient Medications on File Prior to Visit   Medication Sig Dispense Refill    aspirin (ECOTRIN LOW STRENGTH) 81 mg EC tablet Take 2 tablets (162 mg total) by mouth daily 90 tablet 0    docusate sodium (COLACE) 100 mg capsule Take 1 capsule (100 mg total) by mouth 2 (two) times a day 180 capsule 1    Magnesium 250 MG TABS       Prenatal Vit-Fe Fumarate-FA (Prenatal Vitamin and Mineral) 28-0.8 MG TABS Take 1 tablet by mouth in the morning      magnesium (MAGTAB) 84 MG (7MEQ) TBCR Take 250 mg by mouth in the morning. (Patient not taking: Reported on 6/30/2025)      SUMAtriptan (IMITREX) 100 mg tablet Take 1 tablet (100 mg total) by mouth once as needed for migraine May repeat x1 in 2 hours, max 2/24 hours, 9/month (Patient not taking: Reported on 6/4/2025) 9 tablet 11     No current facility-administered medications on file prior to visit.

## 2025-07-01 LAB
DME PARACHUTE DELIVERY DATE REQUESTED: NORMAL
DME PARACHUTE ITEM DESCRIPTION: NORMAL
DME PARACHUTE ORDER STATUS: NORMAL
DME PARACHUTE SUPPLIER NAME: NORMAL
DME PARACHUTE SUPPLIER PHONE: NORMAL

## 2025-07-02 LAB
BASOPHILS # BLD AUTO: 31 CELLS/UL (ref 0–200)
BASOPHILS NFR BLD AUTO: 0.3 %
EOSINOPHIL # BLD AUTO: 112 CELLS/UL (ref 15–500)
EOSINOPHIL NFR BLD AUTO: 1.1 %
ERYTHROCYTE [DISTWIDTH] IN BLOOD BY AUTOMATED COUNT: 11.6 % (ref 11–15)
FERRITIN SERPL-MCNC: 10 NG/ML (ref 16–154)
HCT VFR BLD AUTO: 34.2 % (ref 35–45)
HGB BLD-MCNC: 11.4 G/DL (ref 11.7–15.5)
LYMPHOCYTES # BLD AUTO: 1377 CELLS/UL (ref 850–3900)
LYMPHOCYTES NFR BLD AUTO: 13.5 %
MCH RBC QN AUTO: 32.8 PG (ref 27–33)
MCHC RBC AUTO-ENTMCNC: 33.3 G/DL (ref 32–36)
MCV RBC AUTO: 98.3 FL (ref 80–100)
MONOCYTES # BLD AUTO: 694 CELLS/UL (ref 200–950)
MONOCYTES NFR BLD AUTO: 6.8 %
NEUTROPHILS # BLD AUTO: 7987 CELLS/UL (ref 1500–7800)
NEUTROPHILS NFR BLD AUTO: 78.3 %
PLATELET # BLD AUTO: 209 THOUSAND/UL (ref 140–400)
PMV BLD REES-ECKER: 11.1 FL (ref 7.5–12.5)
RBC # BLD AUTO: 3.48 MILLION/UL (ref 3.8–5.1)
T PALLIDUM AB SER QL IF: NONREACTIVE
WBC # BLD AUTO: 10.2 THOUSAND/UL (ref 3.8–10.8)

## 2025-07-08 DIAGNOSIS — Z91.89 AT RISK FOR HYPERTENSION: ICD-10-CM

## 2025-07-08 RX ORDER — ASPIRIN 81 MG/1
162 TABLET ORAL DAILY
Qty: 90 TABLET | Refills: 0 | Status: SHIPPED | OUTPATIENT
Start: 2025-07-08

## 2025-07-10 ENCOUNTER — PATIENT MESSAGE (OUTPATIENT)
Dept: OBGYN CLINIC | Facility: CLINIC | Age: 35
End: 2025-07-10

## 2025-07-10 NOTE — PATIENT COMMUNICATION
Message subject should read third trimester check in.  Hydroxyzine 25 mg was an error and could not be removed by IT.  Patient aware.

## 2025-07-11 ENCOUNTER — TELEPHONE (OUTPATIENT)
Age: 35
End: 2025-07-11

## 2025-07-11 NOTE — TELEPHONE ENCOUNTER
Overall how are you feeling? Patient states she is doing well.    Compliant with routine OB appointments? Yes  Have you completed your 3rd trimester lab work? Yes    Have you reviewed the contents of 3rd trimester folder from office?  Yes   Have you decided on a pediatrician? Yes    If yes, who:  [facility]    If no, what are you looking for and request sent for outreach.  Questions on paperwork to go back to office? No   Questions on the baby birth certificate forms? No    Sent link for the Hospital Readiness Video via [employer]

## 2025-07-13 PROBLEM — O09.513 ELDERLY PRIMIGRAVIDA, THIRD TRIMESTER: Status: ACTIVE | Noted: 2025-07-13

## 2025-07-14 PROBLEM — Z3A.32 32 WEEKS GESTATION OF PREGNANCY: Status: ACTIVE | Noted: 2025-03-03

## 2025-07-15 ENCOUNTER — ULTRASOUND (OUTPATIENT)
Dept: PERINATAL CARE | Facility: OTHER | Age: 35
End: 2025-07-15
Payer: COMMERCIAL

## 2025-07-15 ENCOUNTER — ANCILLARY PROCEDURE (OUTPATIENT)
Dept: PERINATAL CARE | Facility: OTHER | Age: 35
End: 2025-07-15
Attending: OBSTETRICS & GYNECOLOGY
Payer: COMMERCIAL

## 2025-07-15 VITALS
HEART RATE: 74 BPM | WEIGHT: 160.6 LBS | HEIGHT: 64 IN | BODY MASS INDEX: 27.42 KG/M2 | SYSTOLIC BLOOD PRESSURE: 100 MMHG | DIASTOLIC BLOOD PRESSURE: 70 MMHG

## 2025-07-15 DIAGNOSIS — Z3A.32 32 WEEKS GESTATION OF PREGNANCY: ICD-10-CM

## 2025-07-15 DIAGNOSIS — O09.513 PRIMIGRAVIDA OF ADVANCED MATERNAL AGE IN THIRD TRIMESTER: Primary | ICD-10-CM

## 2025-07-15 PROCEDURE — 76816 OB US FOLLOW-UP PER FETUS: CPT | Performed by: OBSTETRICS & GYNECOLOGY

## 2025-07-15 PROCEDURE — 99212 OFFICE O/P EST SF 10 MIN: CPT | Performed by: PHYSICIAN ASSISTANT

## 2025-07-15 NOTE — ASSESSMENT & PLAN NOTE
>>ASSESSMENT AND PLAN FOR ELDERLY PRIMIGRAVIDA, THIRD TRIMESTER WRITTEN ON 7/15/2025  2:49 PM BY JAYLIN CONDON PA-C    We discussed the appropriate for gestational age growth at the 50th percentile. She will continue on aspirin 162 mg until 36 weeks.

## 2025-07-16 ENCOUNTER — ROUTINE PRENATAL (OUTPATIENT)
Dept: OBGYN CLINIC | Facility: CLINIC | Age: 35
End: 2025-07-16

## 2025-07-16 VITALS — DIASTOLIC BLOOD PRESSURE: 82 MMHG | WEIGHT: 161.2 LBS | BODY MASS INDEX: 27.67 KG/M2 | SYSTOLIC BLOOD PRESSURE: 120 MMHG

## 2025-07-16 DIAGNOSIS — O09.513 PRIMIGRAVIDA OF ADVANCED MATERNAL AGE IN THIRD TRIMESTER: Primary | ICD-10-CM

## 2025-07-16 DIAGNOSIS — Z34.93 THIRD TRIMESTER PREGNANCY: ICD-10-CM

## 2025-07-16 DIAGNOSIS — R79.0 LOW FERRITIN LEVEL: ICD-10-CM

## 2025-07-16 DIAGNOSIS — Z3A.32 32 WEEKS GESTATION OF PREGNANCY: ICD-10-CM

## 2025-07-16 PROCEDURE — PNV: Performed by: ADVANCED PRACTICE MIDWIFE

## 2025-07-16 NOTE — PROGRESS NOTES
Name: Merry Venegas      : 1990      MRN: [id number]  Encounter Provider: Gifty Meehan CNM  Encounter Date: 2025   [facility]  :  Assessment & Plan  Primigravida of advanced maternal age in third trimester  162 mg ASA daily until 36 weeks           Third trimester pregnancy         32 weeks gestation of pregnancy  Reviewed signs and symptoms PTL, FKC  Growth scan-  50%  Next visit 2 weeks           Low ferritin level  Taking Ferrous Sulfate every other day             History of Present Illness   Merry Venegas is a 35 y.o.  female who presents for routine prenatal care at 32w2d.  Pregnancy ROS: no leakage of fluid, pelvic pain, or vaginal bleeding.  Good fetal movement.    Objective:  /82   Wt 73.1 kg (161 lb 3.2 oz)   LMP 2024   BMI 27.67 kg/m²   Pregravid Weight/BMI: 64 kg (141 lb) (BMI 24.19)  Current Weight: 73.1 kg (161 lb 3.2 oz)   Total Weight Gain: 9.163 kg (20 lb 3.2 oz)   Pre-Krunal Vitals    Flowsheet Row Most Recent Value   Prenatal Assessment    Fetal Heart Rate 125   Movement Present   Prenatal Vitals    Blood Pressure 120/82   Weight - Scale 73.1 kg (161 lb 3.2 oz)   Urine Albumin/Glucose    Dilation/Effacement/Station    Vaginal Drainage    Edema    LLE Edema None   RLE Edema None            Merry Venegas is a 35 y.o. female who presents for prenatal care  History obtained from: patient    Review of Systems   Constitutional:  Negative for chills and fever.   Respiratory:  Negative for cough and shortness of breath.    Cardiovascular:  Negative for chest pain and palpitations.   Genitourinary:  Negative for difficulty urinating and vaginal bleeding.   Psychiatric/Behavioral:  The patient is not nervous/anxious.      Medical History Reviewed by provider this encounter:     .  Medications Ordered Prior to Encounter[1]      Objective   /82   Wt 73.1 kg (161 lb 3.2 oz)   LMP 2024   BMI 27.67  kg/m²      Physical Exam  Vitals reviewed.   Constitutional:       Appearance: Normal appearance.   Pulmonary:      Effort: Pulmonary effort is normal.   Abdominal:      Comments: Gravid uterus     Neurological:      Mental Status: She is alert and oriented to person, place, and time.     Psychiatric:         Mood and Affect: Mood normal.         Behavior: Behavior normal.              [1]   Current Outpatient Medications on File Prior to Visit   Medication Sig Dispense Refill    aspirin (ECOTRIN LOW STRENGTH) 81 mg EC tablet Take 2 tablets (162 mg total) by mouth daily 90 tablet 0    docusate sodium (COLACE) 100 mg capsule Take 1 capsule (100 mg total) by mouth 2 (two) times a day 180 capsule 1    ferrous sulfate 324 (65 Fe) mg Take 1 tablet (324 mg total) by mouth daily before breakfast (Patient taking differently: Take 324 mg by mouth daily before breakfast Iron w/  Vitamin C) 30 tablet 3    Magnesium 250 MG TABS       Prenatal Vit-Fe Fumarate-FA (Prenatal Vitamin and Mineral) 28-0.8 MG TABS Take 1 tablet by mouth in the morning      SUMAtriptan (IMITREX) 100 mg tablet Take 1 tablet (100 mg total) by mouth once as needed for migraine May repeat x1 in 2 hours, max 2/24 hours, 9/month (Patient not taking: Reported on 6/4/2025) 9 tablet 11     No current facility-administered medications on file prior to visit.

## 2025-07-18 ENCOUNTER — PATIENT MESSAGE (OUTPATIENT)
Dept: OBGYN CLINIC | Facility: MEDICAL CENTER | Age: 35
End: 2025-07-18

## 2025-07-18 NOTE — PATIENT COMMUNICATION
Patient calling in, she is confused about the message she received.  She reports she needs the provider to write her a breast pump script, not the other way around.

## 2025-07-21 NOTE — PATIENT COMMUNICATION
Pat from [facility] - [phone] - Calling to follow up with breast pump script that was faxed to office and needs provider signature. Reviewed chart, no form in media - no documentation task was completed. Confirm correct fax number. Attempted warm trans to office clerical team. No answer after 2 months and Pat disconnected call.

## 2025-07-25 ENCOUNTER — OFFICE VISIT (OUTPATIENT)
Dept: FAMILY MEDICINE CLINIC | Facility: CLINIC | Age: 35
End: 2025-07-25
Payer: COMMERCIAL

## 2025-07-25 VITALS
SYSTOLIC BLOOD PRESSURE: 98 MMHG | OXYGEN SATURATION: 96 % | HEIGHT: 64 IN | WEIGHT: 159.2 LBS | BODY MASS INDEX: 27.18 KG/M2 | DIASTOLIC BLOOD PRESSURE: 60 MMHG | HEART RATE: 79 BPM | TEMPERATURE: 98 F

## 2025-07-25 DIAGNOSIS — Z34.93 PREGNANT AND NOT YET DELIVERED IN THIRD TRIMESTER: ICD-10-CM

## 2025-07-25 DIAGNOSIS — Z00.00 ANNUAL PHYSICAL EXAM: Primary | ICD-10-CM

## 2025-07-25 PROCEDURE — 99395 PREV VISIT EST AGE 18-39: CPT | Performed by: FAMILY MEDICINE

## 2025-07-25 NOTE — PROGRESS NOTES
"Name: Merry Venegas      : 1990      MRN: [id number]  Encounter Provider: Randy Kerr DO  Encounter Date: 2025   Encounter department: [facility]  :  Assessment & Plan  Annual physical exam  Physical examination is satisfactory.  No abnormalities on exam.       Pregnant and not yet delivered in third trimester  Followed by OB.              History of Present Illness   The patient is a pleasant 35-year-old female who presents today for annual physical examination.  Patient is approximately 34 weeks pregnant.  Her due date is 2025.  She knows that she will be delivering a baby boy.  She is thinking about naming the baby Artemio.  Has a good support system.      Review of Systems   Constitutional:  Negative for chills and fever.   HENT:  Negative for ear pain and sore throat.    Eyes:  Negative for pain and visual disturbance.   Respiratory:  Negative for cough and shortness of breath.    Cardiovascular:  Negative for chest pain and palpitations.   Gastrointestinal:  Negative for abdominal pain and vomiting.   Genitourinary:  Negative for dysuria and hematuria.   Musculoskeletal:  Negative for arthralgias and back pain.   Skin:  Negative for color change and rash.   Neurological:  Negative for seizures and syncope.   Psychiatric/Behavioral: Negative.     All other systems reviewed and are negative.      Objective   BP 98/60   Pulse 79   Temp 98 °F (36.7 °C)   Ht 5' 4\" (1.626 m)   Wt 72.2 kg (159 lb 3.2 oz)   LMP 2024   SpO2 96%   BMI 27.33 kg/m²      Physical Exam  Vitals and nursing note reviewed.   Constitutional:       General: She is not in acute distress.     Appearance: Normal appearance. She is well-developed. She is not ill-appearing, toxic-appearing or diaphoretic.   HENT:      Head: Normocephalic and atraumatic.      Right Ear: Tympanic membrane normal.      Left Ear: Tympanic membrane normal.      Mouth/Throat:      Mouth: Mucous membranes are moist. "     Eyes:      General: No scleral icterus.     Conjunctiva/sclera: Conjunctivae normal.      Pupils: Pupils are equal, round, and reactive to light.       Cardiovascular:      Rate and Rhythm: Normal rate and regular rhythm.      Pulses: Normal pulses.      Heart sounds: Normal heart sounds. No murmur heard.  Pulmonary:      Effort: Pulmonary effort is normal. No respiratory distress.      Breath sounds: Normal breath sounds. No wheezing or rales.   Abdominal:      Palpations: Abdomen is soft.      Tenderness: There is no abdominal tenderness. There is no guarding or rebound.      Comments: Gravid.     Musculoskeletal:         General: No swelling.      Cervical back: Neck supple.     Skin:     General: Skin is warm and dry.      Capillary Refill: Capillary refill takes less than 2 seconds.     Neurological:      General: No focal deficit present.      Mental Status: She is alert and oriented to person, place, and time.     Psychiatric:         Mood and Affect: Mood normal.         Behavior: Behavior normal.         Thought Content: Thought content normal.     Answers submitted by the patient for this visit:  Annual Physical (Submitted on 7/24/2025)  Diet/Nutrition choices: well balanced diet  Exercise choices: walking  Sleep choices: sleeps well, 7-8 hours of sleep on average  Hearing choices: normal hearing bilateral ears  Vision choices: most recent eye exam < 1 year ago, wears glasses and contacts  Dental choices: regular dental visits, brushes teeth twice daily, floss regularly  Do you currently have an OB/GYN provider that you routinely follow with?: Yes  Last menstrual cycle (if applicable):: 12/2/2024  Any history of sexual transmitted disease/infection?: No  Do you have an advance directive/living will?: No  Do you have a durable power of  (POA)?: No

## 2025-07-28 ENCOUNTER — ROUTINE PRENATAL (OUTPATIENT)
Dept: OBGYN CLINIC | Facility: CLINIC | Age: 35
End: 2025-07-28

## 2025-07-28 VITALS — DIASTOLIC BLOOD PRESSURE: 64 MMHG | WEIGHT: 160.4 LBS | BODY MASS INDEX: 27.53 KG/M2 | SYSTOLIC BLOOD PRESSURE: 104 MMHG

## 2025-07-28 DIAGNOSIS — O09.513 PRIMIGRAVIDA OF ADVANCED MATERNAL AGE IN THIRD TRIMESTER: Primary | ICD-10-CM

## 2025-07-28 DIAGNOSIS — Z3A.34 34 WEEKS GESTATION OF PREGNANCY: ICD-10-CM

## 2025-07-28 PROCEDURE — PNV: Performed by: STUDENT IN AN ORGANIZED HEALTH CARE EDUCATION/TRAINING PROGRAM

## 2025-07-29 ENCOUNTER — TELEPHONE (OUTPATIENT)
Age: 35
End: 2025-07-29

## 2025-07-29 ENCOUNTER — PATIENT MESSAGE (OUTPATIENT)
Dept: OBGYN CLINIC | Facility: MEDICAL CENTER | Age: 35
End: 2025-07-29

## 2025-07-29 DIAGNOSIS — K59.01 SLOW TRANSIT CONSTIPATION: ICD-10-CM

## 2025-07-30 ENCOUNTER — CLINICAL SUPPORT (OUTPATIENT)
Dept: POSTPARTUM | Facility: CLINIC | Age: 35
End: 2025-07-30

## 2025-07-30 DIAGNOSIS — Z32.2 ENCOUNTER FOR CHILDBIRTH INSTRUCTION: Primary | ICD-10-CM

## 2025-07-30 RX ORDER — DOCUSATE SODIUM 100 MG/1
100 CAPSULE, LIQUID FILLED ORAL 2 TIMES DAILY
Qty: 180 CAPSULE | Refills: 1 | Status: SHIPPED | OUTPATIENT
Start: 2025-07-30 | End: 2025-08-05 | Stop reason: SDUPTHER

## 2025-08-05 DIAGNOSIS — K59.01 SLOW TRANSIT CONSTIPATION: ICD-10-CM

## 2025-08-05 RX ORDER — DOCUSATE SODIUM 100 MG/1
100 CAPSULE, LIQUID FILLED ORAL 2 TIMES DAILY
Qty: 180 CAPSULE | Refills: 1 | Status: SHIPPED | OUTPATIENT
Start: 2025-08-05

## 2025-08-11 PROBLEM — Z3A.36 36 WEEKS GESTATION OF PREGNANCY: Status: ACTIVE | Noted: 2025-03-03

## 2025-08-12 ENCOUNTER — ROUTINE PRENATAL (OUTPATIENT)
Dept: OBGYN CLINIC | Facility: CLINIC | Age: 35
End: 2025-08-12

## 2025-08-18 PROBLEM — Z3A.37 37 WEEKS GESTATION OF PREGNANCY: Status: ACTIVE | Noted: 2025-03-03

## 2025-08-19 ENCOUNTER — ROUTINE PRENATAL (OUTPATIENT)
Dept: OBGYN CLINIC | Facility: CLINIC | Age: 35
End: 2025-08-19

## 2025-08-19 ENCOUNTER — TELEPHONE (OUTPATIENT)
Dept: OBGYN CLINIC | Facility: MEDICAL CENTER | Age: 35
End: 2025-08-19

## 2025-08-19 VITALS — DIASTOLIC BLOOD PRESSURE: 64 MMHG | WEIGHT: 162 LBS | SYSTOLIC BLOOD PRESSURE: 102 MMHG | BODY MASS INDEX: 27.81 KG/M2

## 2025-08-19 DIAGNOSIS — R79.0 LOW FERRITIN LEVEL: ICD-10-CM

## 2025-08-19 DIAGNOSIS — Z3A.37 37 WEEKS GESTATION OF PREGNANCY: ICD-10-CM

## 2025-08-19 DIAGNOSIS — Z34.93 THIRD TRIMESTER PREGNANCY: ICD-10-CM

## 2025-08-19 DIAGNOSIS — O09.513 PRIMIGRAVIDA OF ADVANCED MATERNAL AGE IN THIRD TRIMESTER: Primary | ICD-10-CM

## 2025-08-19 PROCEDURE — PNV: Performed by: ADVANCED PRACTICE MIDWIFE
